# Patient Record
Sex: MALE | Race: WHITE | Employment: UNEMPLOYED | URBAN - METROPOLITAN AREA
[De-identification: names, ages, dates, MRNs, and addresses within clinical notes are randomized per-mention and may not be internally consistent; named-entity substitution may affect disease eponyms.]

---

## 2018-01-09 ENCOUNTER — OFFICE VISIT (OUTPATIENT)
Dept: ORTHOPEDIC SURGERY | Age: 38
End: 2018-01-09

## 2018-01-09 VITALS
HEIGHT: 69 IN | DIASTOLIC BLOOD PRESSURE: 79 MMHG | BODY MASS INDEX: 22.96 KG/M2 | SYSTOLIC BLOOD PRESSURE: 128 MMHG | HEART RATE: 77 BPM | WEIGHT: 155 LBS

## 2018-01-09 DIAGNOSIS — M24.151 ARTICULAR CARTILAGE DISORDER OF HIP, RIGHT: ICD-10-CM

## 2018-01-09 DIAGNOSIS — M25.851 FEMOROACETABULAR IMPINGEMENT OF RIGHT HIP: ICD-10-CM

## 2018-01-09 DIAGNOSIS — M25.551 RIGHT HIP PAIN: Primary | ICD-10-CM

## 2018-01-09 PROCEDURE — 99214 OFFICE O/P EST MOD 30 MIN: CPT | Performed by: ORTHOPAEDIC SURGERY

## 2018-01-09 PROCEDURE — 73502 X-RAY EXAM HIP UNI 2-3 VIEWS: CPT | Performed by: ORTHOPAEDIC SURGERY

## 2018-01-09 NOTE — PROGRESS NOTES
Oriented to [x] person, [x] place, and [x] time  [x] Mood appropriate for circumstances    Gait:   Gait is [] Normal  [x] Impaired Coxalgia  Assistive Device: [x] None  [] Knee Brace  [] Cane  [] Crutches   [] Aleatha Standard   [] Wheelchair  [] Other     [address] [place]   Inspection:  [x] Skin intact without abrasion, lacerations, surgical scars, pigment changes, dimpling, hairy patches or rashes  [x] Normal back alignment  [] Scoliosis [] Kyphosis  [] Dimpling  [] Hyper/hypopigmentation  [] Hairy Patches  [] Scar / Surgical incision    Palpation:   Nontender    Provocative Tests:  Negative Straight leg raise test    RIGHT HIP ORTHOPAEDIC  EXAM:   Inspection:  [x] Skin intact without abrasion, lacerations or rashes  [x] Leg lengths equal  [] Ecchymosis:  [x] none  [] mild  [] moderate  [] severe   [] Atrophy:  [x] none  [] mild  [] moderate  [] severe      Range of Motion:  [x] No flexion contracture         [] Deferred: acute injury/post-surgery/pain  [] Flexion contracture    Forward flexion: 80 positive subspine  Supine Internal rotation: 10 positive labral stress test  Supine External rotation: 45  Abduction: 55  Adduction: 25      Palpation:   Moderate tenderness over rectus femoris origin    no tenderness over greater trochanter    Provocative Tests:  [] Negative  Positive Tests:  [] Log Roll   [] Gold Test: ITB Tightness   [x] FADIR Anterior impingement: Positive   [] Posterior Impingement    [] Shuck test for insufficient suction seal   [] Dial test for capsular insufficiency:    [] Resisted adduction for athletic pubalgia   [] Resisted curl up for athletic pubalgia     Motor Function:  [x] No gross motor weakness of hip [x] No gross motor weakness of knee  [x] No gross motor weakness of ankle    [x] No gross motor weakness of great toe    [] Motor strength:   [x] Hip Flex [x] 5-/5 [] 4/5 [] 3/5 [] 2/5 [] 1/5 [] 0/5   [x] Hip ABductors [x] 5/5 [] 4/5 [] 3/5 [] 2/5 [] 1/5 [] 0/5   [x] Hip ADductors [x] 5/5 [] 4/5 [] 3/5 [] 2/5 [] 1/5 [] 0/5     Neurologic:   [x] Sensation to light touch intact  [x] Coordination / proprioception intact  Motor function intact L2-S1    Circulation:  [x] The limb is warm and well perfused. [x] Capillary refill is intact.   [] Edema:  [x] none  [] mild  [] moderate  [] severe     Assessment of Joint Laxity:    Beighton hypermobility score (0-9): 0  [] Small fingers passively able to extend beyond 90 degrees (2 pts)   [] Thumbs passively able to flex to flexor aspect of forearm (2 pts)   [] Elbows hyperextend beyond 10 degrees (2 pts)   [] Knees hyperextend beyond 10 degrees (2 pts)   [] Can rest palms on floor with forward flexion of trunk with knees extended (1 pt)          LEFT HIP ORTHOPAEDIC  EXAM:  Inspection:  [x] Skin intact without abrasion, lacerations or rashes  [x] Leg lengths equal  [] Ecchymosis:  [x] none  [] mild  [] moderate  [] severe   [] Atrophy:  [x] none  [] mild  [] moderate  [] severe      Range of Motion:  [x] No flexion contracture         [] Deferred: acute injury/post-surgery/pain  [] Flexion contracture     Forward flexion: 110  Supine Internal rotation: 20  Supine External rotation: 65  Abduction: 50  Adduction: 30      Palpation:   Nontender    Provocative Tests:  [x] Negative  Positive Tests:  [] Log Roll   [] Gold Test: ITB Tightness   [] FADIR Anterior impingement:    [] Posterior Impingement    [] Shuck test for insufficient suction seal   [] Dial test for capsular insufficiency:    [] Resisted adduction for athletic pubalgia   [] Resisted curl up for athletic pubalgia     Motor Function:  [x] No gross motor weakness of hip [x] No gross motor weakness of knee  [x] No gross motor weakness of ankle    [x] No gross motor weakness of great toe    [] Motor strength:   [x] Hip Flex [x] 5/5 [] 4/5 [] 3/5 [] 2/5 [] 1/5 [] 0/5   [x] Hip ABductors [x] 5/5 [] 4/5 [] 3/5 [] 2/5 [] 1/5 [] 0/5   [x] Hip ADductors [x] 5/5 [] 4/5 [] 3/5 [] 2/5 [] 1/5 [] 0/5

## 2018-01-09 NOTE — LETTER
Imaging- Follow Up Sheet   Ordering Physician: Dr. Ariane Jacques -  Tax Id: [id number]    Date:18    Patient:Avelino Dominguez                [id number]                      1980                  30y.o. year old,          Test ordered:                                                    CPT code:       Associated Diagnosis:      Insurance:                                                                                           Rep:                                     ? ID:                                             ? Group:                                                                  Peer to Peer:  ? Approval:                                                                             Case#  ? Exp.date:  Scan Priority ([place] [facility]):     ? Routine  ? stat  Scan:  ? Appointment    ? Time:  ? Location  :     Follow up   ? Appointment  ? Time  ? Location  [facility]:                Central scheduling :  [facility]   Fax: [phone]                       ? [facility]                [phone]               [address] [facility]                [phone]  ? Valshinrhett Person                    [phone]  ? Kelly Martinez           [phone]            ? Farida Dolan             [phone]  ? Calixto Barba               [phone]  ?  James Salazar              [phone]    [facility] [phone]             Fax: [phone]    Special Note:

## 2018-01-19 ENCOUNTER — TELEPHONE (OUTPATIENT)
Dept: ORTHOPEDIC SURGERY | Age: 38
End: 2018-01-19

## 2018-01-19 ENCOUNTER — OFFICE VISIT (OUTPATIENT)
Dept: ORTHOPEDIC SURGERY | Age: 38
End: 2018-01-19

## 2018-01-19 VITALS
DIASTOLIC BLOOD PRESSURE: 76 MMHG | HEIGHT: 69 IN | SYSTOLIC BLOOD PRESSURE: 131 MMHG | WEIGHT: 155 LBS | BODY MASS INDEX: 22.96 KG/M2 | HEART RATE: 78 BPM

## 2018-01-19 DIAGNOSIS — M24.151 ARTICULAR CARTILAGE DISORDER OF HIP, RIGHT: ICD-10-CM

## 2018-01-19 DIAGNOSIS — M25.851 FEMOROACETABULAR IMPINGEMENT OF RIGHT HIP: Primary | ICD-10-CM

## 2018-01-19 PROCEDURE — 99214 OFFICE O/P EST MOD 30 MIN: CPT | Performed by: ORTHOPAEDIC SURGERY

## 2018-01-19 NOTE — ADDENDUM NOTE
Encounter addended by: Mariela Rodriguez on: 1/19/2018 12:48 PM<BR>    Actions taken: Letter status changed

## 2018-01-19 NOTE — LETTER
? Infection  ? Poor Healing  ? Possible Damage to Nerve, Vessel, Tendon/Muscle or Bone  ? Need for further Treatment/Surgery  ? Stiffness  ? Pain  ? Residual or Recurrent Symptom  ? Anesthetic and/or Medical Risks including death  ? Risks specific to Hip arthroscopy: traction related neuropraxia of leg, foot or groin; heterotopic ossification; inability to reverse course of arthritis; chondrolysis; flexor tendinitis; microinstability       4. I have also been informed by the informing physician that there are other risks from both known and unknown causes that are attendant to the performance of any surgical procedure. I am aware that the practice of medicine and surgery is not an exact science, and that no guarantees have been made to me concerning the results of the operation and/or procedure(s). 5. I   CONSENT / REFUSE CONSENT  (strike the phrase that does not apply) to the taking of photographs before, during and/or after the operation or procedure for scientific/educational purposes. 6. I consent to the administration of anesthesia and to the use of such anesthetics as may be deemed advisable by the anesthesiologist who has been engaged by me or my physician.     7. I certify that I have read and understand the above consent to operation and/or other procedure(s); that the explanations therein referred to were made to me by the informing physician in advance of my signing this consent; that all blanks or statements requiring insertion or completion were filled in and inapplicable paragraphs, if any, were stricken before I signed; and that all questions asked by me about the operation and/or procedure(s) which I have consented to have been fully answered in a satisfactory manner.             _______________________  Witness        Signature Of Patient      Lara Lancaster MD.       Informing Physician         Signature of Informing Physician If patient is unable to sign or is a minor, complete one of the following:    (A)  Patient is a minor   years of age. (B)  Patient is unable to sign because: The undersigned represents that he or she is duly authorized to execute this consent for and on behalf of the above named patient.                Witness               o  Parent  o  Guardian   o  Spouse       o  Other (specify)

## 2018-01-19 NOTE — PROGRESS NOTES
y.o. 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 This is a condition in which there is an incongruency between the osseous structures surrounding the hip and can lead to persistent pain, labral injury and cartilage injury in some cases. Pain is anterior and has been present for 7 years. Thus far the following treatments have been tried: 7 years of activity modification, oral NSAIDs, therapy. Diagnosis:   1. Femoroacetabular impingement of right hip  CANCELED: Aluminum Crutches    CANCELED: Breg T-Scope Hip   2. Articular cartilage disorder of hip, right           Plan:     I discussed the diagnosis and the treatment options with Jai Whittaker today. There is fails conservative management, we did discuss the risks, benefits, and alternatives of surgical intervention for a hip arthroscopy. I did indicate to him that he does have some extensive hip degeneration already and as such her goal would primarily be to help reduce the accelerated trajectory his hip is currently on towards arthritis. Our goal would be to try to alleviate the main risk factors for accelerated arthritic pathology. He does understand that there is the possibility does hip may eventually go on to a hip arthroplasty. Greater than 25 minutes was spent counseling and corning care and patient signed informed consent       Return to Clinic/Follow - Up:  Michele Palma was instructed to call the office if his symptoms worsen or if new symptoms appear prior to the next scheduled visit. He is specifically instructed to contact the office between now & his scheduled appointment if he has concerns related to his condition or if he needs assistance in scheduling the above tests. He is welcome to call for an appointment sooner if he has any additional concerns or questions.           Patient Education

## 2018-01-19 NOTE — LETTER
Dr. Christiano Chandler MD  [address] [facility], [address] [place]  [phone]      Re: Clau Zamarripa  : 1980    Date of Visit:   18      To Whom It May Concern;    Clau Zamarripa is a patient under my care for a worsening orthopaedic condition. This patient is undergoing surgery for this condition on 2018 and as a result of the average post operative recovery time for this procedure, the patient has been advised of the following:    Clau Zamarripa should remain out of work until 2018       Please feel free to contact me if you need any clarification or have further questions. Should you require any copies of medical records, please forward a HIPAA compliant release of records from the patient.             Sincerely,            Christiano Chandler MD

## 2018-01-23 ENCOUNTER — TELEPHONE (OUTPATIENT)
Dept: ORTHOPEDIC SURGERY | Age: 38
End: 2018-01-23

## 2018-01-30 ENCOUNTER — TELEPHONE (OUTPATIENT)
Dept: ORTHOPEDIC SURGERY | Age: 38
End: 2018-01-30

## 2018-01-30 DIAGNOSIS — M24.151 ARTICULAR CARTILAGE DISORDER OF HIP, RIGHT: ICD-10-CM

## 2018-01-30 DIAGNOSIS — M25.851 FEMOROACETABULAR IMPINGEMENT OF RIGHT HIP: Primary | ICD-10-CM

## 2018-01-30 PROCEDURE — E0114 CRUTCH UNDERARM PAIR NO WOOD: HCPCS | Performed by: ORTHOPAEDIC SURGERY

## 2018-01-30 PROCEDURE — L1686 HO POST-OP HIP ABDUCTION: HCPCS | Performed by: ORTHOPAEDIC SURGERY

## 2018-01-30 RX ORDER — IBUPROFEN 400 MG/1
400 TABLET ORAL EVERY 6 HOURS PRN
COMMUNITY
End: 2018-01-31 | Stop reason: HOSPADM

## 2018-01-30 RX ORDER — ACETAMINOPHEN 500 MG
500 TABLET ORAL EVERY 6 HOURS PRN
COMMUNITY
End: 2018-01-31 | Stop reason: HOSPADM

## 2018-01-30 NOTE — TELEPHONE ENCOUNTER
1/30/18 - DME  - [facility]  [facility] A WEEK AND A HALF AGO AND PATIENT STILL HAD NOT RECEIVED THE BRACE. DR. Hever Malloy AND WAS TOLD WE COULD PROVIDE BRACE. I WAS ASKED TO VERIFY INFORMATION WITH THE [facility] OFFICE. I CALLED ROSARIO IRVIN, PATIENT CARE COORDINATOR ([phone]) AND WAS TOLD THAT PATIENT SHOULD OF RECEIVED BRACE FROM [facility] BUT SINCE SURGERY WAS TOMORROW AND HE DID NOT HAVE THE BRACE  THAT  WE  COULD SUPPLY THE BRACE AND THAT A COPY OF THE AUTHORIZATION NUMBER (  AUTH NUMBER FOR SURGERY) SHOULD BE SENT WITH CLAIM FOR DME ITEM. WAS TOLD SHOULD BILL THE DME WITH THAT SAME AUTHORIZATION NUMBER. ROSARIO DID ASK WHAT PERSON FROM THE CLINIC CALLED AND TO WHAT PERSON AT THE [facility] DID THEY TALK TO . I DID NOT HAVE THAT INFORMATION.

## 2018-01-30 NOTE — PRE-PROCEDURE INSTRUCTIONS
[address]                          Date of Procedure 1/31 Time of Procedure 1300    PRIOR TO PROCEDURE DATE:  1. Please follow any guidelines/instructions prior to your procedure as advised by your surgeon. 2. Arrange for someone to drive you home and be with you for the first 24 hours after discharge for your safety after your procedure for which you received sedation. Ensure it is someone we can share information with regarding your discharge. 3. You must contact your surgeon for instructions IF:   You are taking any blood thinners, aspirin, anti-inflammatory or vitamin E.   There is a change in your physical condition such as a cold, fever, rash, cuts, sores or any other infection, especially near your surgical site. 4. Do not drink alcohol the day before or day of your procedure. 5. A Pre-op History and Physical for surgery MUST be completed by your Physician or Urgent Care within 30 days of your procedure date. Please bring a copy with you on the day of your procedure and along with any other testing performed. THE DAY OF YOUR PROCEDURE:  1. Follow instructions for ARRIVAL TIME as DIRECTED BY YOUR SURGEON. If your surgeon does not give you a specific arrival time, please arrive at  [address] [place] [address] . 2. Enter the MAIN entrance from [facility] and follow the signs to the free [employer] or [employer] [place] parking (offered free of charge 6am-5pm). 3. Enter the Main Entrance of the hospital (do not enter from the lower level of the parking garage). Upon entrance, check in with the  at the main desk on your left. If no one is available at the desk, proceed into the [facility] Waiting Room and go through the door directly into the [facility]. There is a Check-in desk ACROSS from Room 5 (marked with a sign hanging from the ceiling). The phone number for the surgery center is [phone].     4. Please

## 2018-01-31 ENCOUNTER — HOSPITAL ENCOUNTER (OUTPATIENT)
Dept: PREADMISSION TESTING | Age: 38
Discharge: HOME | End: 2018-01-31
Attending: ORTHOPAEDIC SURGERY | Admitting: ORTHOPAEDIC SURGERY

## 2018-01-31 VITALS
WEIGHT: 162 LBS | TEMPERATURE: 97.4 F | OXYGEN SATURATION: 95 % | DIASTOLIC BLOOD PRESSURE: 68 MMHG | HEIGHT: 69 IN | BODY MASS INDEX: 23.99 KG/M2 | HEART RATE: 78 BPM | RESPIRATION RATE: 18 BRPM | SYSTOLIC BLOOD PRESSURE: 116 MMHG

## 2018-01-31 DIAGNOSIS — M25.851 FEMOROACETABULAR IMPINGEMENT OF RIGHT HIP: ICD-10-CM

## 2018-01-31 DIAGNOSIS — Z98.890 STATUS POST HIP SURGERY: Primary | ICD-10-CM

## 2018-01-31 LAB
ABO/RH: NORMAL
ANION GAP SERPL CALCULATED.3IONS-SCNC: 10 MMOL/L (ref 3–16)
ANTIBODY SCREEN: NORMAL
APTT: 28.9 SEC (ref 24.1–34.9)
BUN BLDV-MCNC: 14 MG/DL (ref 7–20)
CALCIUM SERPL-MCNC: 9.1 MG/DL (ref 8.3–10.6)
CHLORIDE BLD-SCNC: 106 MMOL/L (ref 99–110)
CO2: 26 MMOL/L (ref 21–32)
CREAT SERPL-MCNC: 1 MG/DL (ref 0.9–1.3)
GFR AFRICAN AMERICAN: >60
GFR NON-AFRICAN AMERICAN: >60
GLUCOSE BLD-MCNC: 110 MG/DL (ref 70–99)
HCT VFR BLD CALC: 45.1 % (ref 40.5–52.5)
HEMOGLOBIN: 15.8 G/DL (ref 13.5–17.5)
INR BLD: 1.01 (ref 0.85–1.15)
MCH RBC QN AUTO: 30.9 PG (ref 26–34)
MCHC RBC AUTO-ENTMCNC: 35 G/DL (ref 31–36)
MCV RBC AUTO: 88.4 FL (ref 80–100)
PDW BLD-RTO: 12.5 % (ref 12.4–15.4)
PLATELET # BLD: 227 K/UL (ref 135–450)
PMV BLD AUTO: 8.2 FL (ref 5–10.5)
POTASSIUM SERPL-SCNC: 4 MMOL/L (ref 3.5–5.1)
PROTHROMBIN TIME: 11.4 SEC (ref 9.6–13)
RBC # BLD: 5.1 M/UL (ref 4.2–5.9)
SODIUM BLD-SCNC: 142 MMOL/L (ref 136–145)
WBC # BLD: 7.3 K/UL (ref 4–11)

## 2018-01-31 RX ORDER — ONDANSETRON 2 MG/ML
4 INJECTION INTRAMUSCULAR; INTRAVENOUS EVERY 6 HOURS PRN
Status: DISCONTINUED | OUTPATIENT
Start: 2018-01-31 | End: 2018-02-01 | Stop reason: HOSPADM

## 2018-01-31 RX ORDER — FENTANYL CITRATE 50 UG/ML
25 INJECTION, SOLUTION INTRAMUSCULAR; INTRAVENOUS EVERY 5 MIN PRN
Status: DISCONTINUED | OUTPATIENT
Start: 2018-01-31 | End: 2018-02-01 | Stop reason: HOSPADM

## 2018-01-31 RX ORDER — PROMETHAZINE HYDROCHLORIDE 25 MG/ML
6.25 INJECTION, SOLUTION INTRAMUSCULAR; INTRAVENOUS
Status: ACTIVE | OUTPATIENT
Start: 2018-01-31 | End: 2018-01-31

## 2018-01-31 RX ORDER — GLYCOPYRROLATE 0.2 MG/ML
0.1 INJECTION INTRAMUSCULAR; INTRAVENOUS ONCE
Status: COMPLETED | OUTPATIENT
Start: 2018-01-31 | End: 2018-01-31

## 2018-01-31 RX ORDER — OXYCODONE HYDROCHLORIDE AND ACETAMINOPHEN 5; 325 MG/1; MG/1
2 TABLET ORAL EVERY 4 HOURS PRN
Status: DISCONTINUED | OUTPATIENT
Start: 2018-01-31 | End: 2018-02-01 | Stop reason: HOSPADM

## 2018-01-31 RX ORDER — NALOXONE HYDROCHLORIDE 0.4 MG/ML
0.1 INJECTION, SOLUTION INTRAMUSCULAR; INTRAVENOUS; SUBCUTANEOUS ONCE
Status: DISCONTINUED | OUTPATIENT
Start: 2018-01-31 | End: 2018-02-01 | Stop reason: HOSPADM

## 2018-01-31 RX ORDER — ASPIRIN 325 MG
325 TABLET, DELAYED RELEASE (ENTERIC COATED) ORAL 2 TIMES DAILY WITH MEALS
Qty: 56 TABLET | Refills: 0 | Status: SHIPPED | OUTPATIENT
Start: 2018-01-31 | End: 2018-03-13

## 2018-01-31 RX ORDER — ONDANSETRON 2 MG/ML
4 INJECTION INTRAMUSCULAR; INTRAVENOUS
Status: ACTIVE | OUTPATIENT
Start: 2018-01-31 | End: 2018-01-31

## 2018-01-31 RX ORDER — LABETALOL HYDROCHLORIDE 5 MG/ML
5 INJECTION, SOLUTION INTRAVENOUS EVERY 10 MIN PRN
Status: DISCONTINUED | OUTPATIENT
Start: 2018-01-31 | End: 2018-02-01 | Stop reason: HOSPADM

## 2018-01-31 RX ORDER — NALOXONE HYDROCHLORIDE 0.4 MG/ML
INJECTION, SOLUTION INTRAMUSCULAR; INTRAVENOUS; SUBCUTANEOUS
Status: DISPENSED
Start: 2018-01-31 | End: 2018-02-01

## 2018-01-31 RX ORDER — MEPERIDINE HYDROCHLORIDE 25 MG/ML
12.5 INJECTION INTRAMUSCULAR; INTRAVENOUS; SUBCUTANEOUS ONCE
Status: COMPLETED | OUTPATIENT
Start: 2018-01-31 | End: 2018-01-31

## 2018-01-31 RX ORDER — SODIUM CHLORIDE, SODIUM LACTATE, POTASSIUM CHLORIDE, CALCIUM CHLORIDE 600; 310; 30; 20 MG/100ML; MG/100ML; MG/100ML; MG/100ML
INJECTION, SOLUTION INTRAVENOUS CONTINUOUS
Status: DISCONTINUED | OUTPATIENT
Start: 2018-01-31 | End: 2018-02-01 | Stop reason: HOSPADM

## 2018-01-31 RX ORDER — DOCUSATE SODIUM 100 MG/1
100 CAPSULE, LIQUID FILLED ORAL 2 TIMES DAILY PRN
Qty: 60 CAPSULE | Refills: 0 | Status: SHIPPED | OUTPATIENT
Start: 2018-01-31 | End: 2018-03-13

## 2018-01-31 RX ORDER — CYCLOBENZAPRINE HCL 5 MG
5 TABLET ORAL 2 TIMES DAILY PRN
Qty: 45 TABLET | Refills: 0 | Status: SHIPPED | OUTPATIENT
Start: 2018-01-31 | End: 2018-02-10

## 2018-01-31 RX ORDER — FENTANYL CITRATE 50 UG/ML
50 INJECTION, SOLUTION INTRAMUSCULAR; INTRAVENOUS EVERY 5 MIN PRN
Status: DISCONTINUED | OUTPATIENT
Start: 2018-01-31 | End: 2018-02-01 | Stop reason: HOSPADM

## 2018-01-31 RX ORDER — DOCUSATE SODIUM 100 MG/1
100 CAPSULE, LIQUID FILLED ORAL 2 TIMES DAILY
Status: DISCONTINUED | OUTPATIENT
Start: 2018-01-31 | End: 2018-02-01 | Stop reason: HOSPADM

## 2018-01-31 RX ORDER — ROPIVACAINE HYDROCHLORIDE 5 MG/ML
INJECTION, SOLUTION EPIDURAL; INFILTRATION; PERINEURAL
Status: DISCONTINUED
Start: 2018-01-31 | End: 2018-02-01 | Stop reason: HOSPADM

## 2018-01-31 RX ORDER — OXYCODONE HCL 10 MG/1
20 TABLET, FILM COATED, EXTENDED RELEASE ORAL ONCE
Status: COMPLETED | OUTPATIENT
Start: 2018-01-31 | End: 2018-01-31

## 2018-01-31 RX ORDER — ACETAMINOPHEN 500 MG
1000 TABLET ORAL ONCE
Status: COMPLETED | OUTPATIENT
Start: 2018-01-31 | End: 2018-01-31

## 2018-01-31 RX ORDER — OXYCODONE HYDROCHLORIDE AND ACETAMINOPHEN 5; 325 MG/1; MG/1
1 TABLET ORAL
Status: COMPLETED | OUTPATIENT
Start: 2018-01-31 | End: 2018-01-31

## 2018-01-31 RX ORDER — PANTOPRAZOLE SODIUM 20 MG/1
20 TABLET, DELAYED RELEASE ORAL DAILY
Qty: 28 TABLET | Refills: 0 | Status: SHIPPED | OUTPATIENT
Start: 2018-01-31 | End: 2018-03-13

## 2018-01-31 RX ORDER — SODIUM CHLORIDE 0.9 % (FLUSH) 0.9 %
10 SYRINGE (ML) INJECTION EVERY 12 HOURS SCHEDULED
Status: DISCONTINUED | OUTPATIENT
Start: 2018-01-31 | End: 2018-02-01 | Stop reason: HOSPADM

## 2018-01-31 RX ORDER — CYCLOBENZAPRINE HCL 10 MG
5 TABLET ORAL 3 TIMES DAILY PRN
Status: DISCONTINUED | OUTPATIENT
Start: 2018-01-31 | End: 2018-02-01 | Stop reason: HOSPADM

## 2018-01-31 RX ORDER — SCOLOPAMINE TRANSDERMAL SYSTEM 1 MG/1
1 PATCH, EXTENDED RELEASE TRANSDERMAL ONCE
Status: DISCONTINUED | OUTPATIENT
Start: 2018-01-31 | End: 2018-02-01 | Stop reason: HOSPADM

## 2018-01-31 RX ORDER — HYDRALAZINE HYDROCHLORIDE 20 MG/ML
5 INJECTION INTRAMUSCULAR; INTRAVENOUS EVERY 10 MIN PRN
Status: DISCONTINUED | OUTPATIENT
Start: 2018-01-31 | End: 2018-02-01 | Stop reason: HOSPADM

## 2018-01-31 RX ORDER — INDOMETHACIN 75 MG/1
75 CAPSULE, EXTENDED RELEASE ORAL
Qty: 7 CAPSULE | Refills: 0 | Status: SHIPPED | OUTPATIENT
Start: 2018-01-31 | End: 2018-03-13

## 2018-01-31 RX ORDER — OXYCODONE HYDROCHLORIDE AND ACETAMINOPHEN 5; 325 MG/1; MG/1
1 TABLET ORAL EVERY 4 HOURS PRN
Status: DISCONTINUED | OUTPATIENT
Start: 2018-01-31 | End: 2018-02-01 | Stop reason: HOSPADM

## 2018-01-31 RX ORDER — NALOXONE HYDROCHLORIDE 0.4 MG/ML
0.1 INJECTION, SOLUTION INTRAMUSCULAR; INTRAVENOUS; SUBCUTANEOUS PRN
Status: COMPLETED | OUTPATIENT
Start: 2018-01-31 | End: 2018-01-31

## 2018-01-31 RX ORDER — SODIUM CHLORIDE 0.9 % (FLUSH) 0.9 %
10 SYRINGE (ML) INJECTION PRN
Status: DISCONTINUED | OUTPATIENT
Start: 2018-01-31 | End: 2018-02-01 | Stop reason: HOSPADM

## 2018-01-31 RX ORDER — OXYCODONE HYDROCHLORIDE AND ACETAMINOPHEN 5; 325 MG/1; MG/1
1 TABLET ORAL EVERY 4 HOURS PRN
Qty: 30 TABLET | Refills: 0 | Status: SHIPPED | OUTPATIENT
Start: 2018-01-31 | End: 2018-02-07

## 2018-01-31 RX ORDER — ACETAMINOPHEN 325 MG/1
650 TABLET ORAL EVERY 4 HOURS PRN
Status: DISCONTINUED | OUTPATIENT
Start: 2018-01-31 | End: 2018-02-01 | Stop reason: HOSPADM

## 2018-01-31 RX ADMIN — FENTANYL CITRATE 50 MCG: 50 INJECTION, SOLUTION INTRAMUSCULAR; INTRAVENOUS at 11:40

## 2018-01-31 RX ADMIN — OXYCODONE HYDROCHLORIDE AND ACETAMINOPHEN 1 TABLET: 5; 325 TABLET ORAL at 13:08

## 2018-01-31 RX ADMIN — GLYCOPYRROLATE 0.1 MG: 0.2 INJECTION INTRAMUSCULAR; INTRAVENOUS at 06:56

## 2018-01-31 RX ADMIN — MEPERIDINE HYDROCHLORIDE 12.5 MG: 25 INJECTION INTRAMUSCULAR; INTRAVENOUS; SUBCUTANEOUS at 11:31

## 2018-01-31 RX ADMIN — Medication 1000 MG: at 06:29

## 2018-01-31 RX ADMIN — Medication 0.5 MG: at 12:09

## 2018-01-31 RX ADMIN — FENTANYL CITRATE 50 MCG: 50 INJECTION, SOLUTION INTRAMUSCULAR; INTRAVENOUS at 11:31

## 2018-01-31 RX ADMIN — OXYCODONE HCL 20 MG: 10 TABLET, FILM COATED, EXTENDED RELEASE ORAL at 06:26

## 2018-01-31 RX ADMIN — SODIUM CHLORIDE, SODIUM LACTATE, POTASSIUM CHLORIDE, CALCIUM CHLORIDE: 600; 310; 30; 20 INJECTION, SOLUTION INTRAVENOUS at 06:55

## 2018-01-31 RX ADMIN — Medication 0.5 MG: at 12:02

## 2018-01-31 RX ADMIN — NALOXONE HYDROCHLORIDE 0.1 MG: 0.4 INJECTION, SOLUTION INTRAMUSCULAR; INTRAVENOUS; SUBCUTANEOUS at 13:13

## 2018-01-31 ASSESSMENT — PAIN SCALES - GENERAL
PAINLEVEL_OUTOF10: 8
PAINLEVEL_OUTOF10: 8
PAINLEVEL_OUTOF10: 7
PAINLEVEL_OUTOF10: 9
PAINLEVEL_OUTOF10: 7
PAINLEVEL_OUTOF10: 9
PAINLEVEL_OUTOF10: 6
PAINLEVEL_OUTOF10: 5
PAINLEVEL_OUTOF10: 6
PAINLEVEL_OUTOF10: 9
PAINLEVEL_OUTOF10: 8
PAINLEVEL_OUTOF10: 5
PAINLEVEL_OUTOF10: 8

## 2018-01-31 ASSESSMENT — PAIN DESCRIPTION - PAIN TYPE
TYPE: SURGICAL PAIN

## 2018-01-31 ASSESSMENT — PAIN DESCRIPTION - LOCATION
LOCATION: HIP

## 2018-01-31 ASSESSMENT — PAIN DESCRIPTION - DESCRIPTORS
DESCRIPTORS: ACHING
DESCRIPTORS: ACHING
DESCRIPTORS: ACHING;CONSTANT;STABBING
DESCRIPTORS: ACHING
DESCRIPTORS: SHOOTING

## 2018-01-31 ASSESSMENT — PAIN - FUNCTIONAL ASSESSMENT: PAIN_FUNCTIONAL_ASSESSMENT: 0-10

## 2018-01-31 ASSESSMENT — PAIN DESCRIPTION - ORIENTATION
ORIENTATION: RIGHT

## 2018-01-31 ASSESSMENT — PAIN DESCRIPTION - PROGRESSION
CLINICAL_PROGRESSION: GRADUALLY WORSENING
CLINICAL_PROGRESSION: GRADUALLY IMPROVING
CLINICAL_PROGRESSION: RAPIDLY IMPROVING

## 2018-01-31 NOTE — PROGRESS NOTES
Patient admitted to PACU # 13 from OR at 1111 post RIGHT HIP ARTHROSCOPY LABRAL DEBRIDEMENT , ACETABULAR RIM  TRIMMING AND FEMORAL OSTEOPLASTY, CHONDROPLASTY, LOOSE BODY REMOVAL , SUB-SPINE  DECOMPRESSION, per Dr. Latoya Espinosa. Attached to PACU monitoring system and report received from anesthesia provider. Patient was reported to be hemodynamically stable during procedure. Patient drowsy on admission and having pain. See MAR for med administration. Still a little shivering, warm blankets applied.
Pt CO of itching chest and stomach unknown source called Dr Debra Segovia repeated pt CO received order for Narcan 0.1mg
The following educational items and goals will be achieved upon completion of the patient's Pre-admission testing experience:             Identify the learner who is being assessed for education:  patient                    Ability to Learn:  Exhibits ability to grasp concepts and respond to questions: High  Ready to Learn: Yes  calm   Preferred Method of Learning:  verbal  Barriers to Learning: Verbalizes interest  Special Considerations due to cultural, Orthodoxy, spiritual beliefs:  No  Language:  English  :  Charo Aldrich  [x] Appropriate evaluation / integration of data as delineated by ASPAN Standards of Perianesthesia Nursing Practice    Pain scale and pain management   [x]Patient verbalizes understanding of pain scale and pain management  [x]Pre-operative determination of patients anticipated Post-Operative pain goal:   4 of 10 on 10 point scale post op goal  [] Other     Medication(s) - Compliance with preop medication instructions  [x] Patient verbalizes understanding of preop medications (see [facility] Presurgical Instructions)    Instructions, Pre op                                                                                            [x] Patient verbalizes understanding of presurgical instructions as reviewed with phone interview nurse or in-person nurse review    Fall Risk Potential, Preoperatively                                                                                   [x]No preoperative risk identified  []Preop risk identified:                    []Sensory deficit        []Motor deficit        []Balance problem        []Home medication        []Uses assistive device                    []History of a Fall within the last 30 days    Goal(s) for fall prevention:  [x]Prevent fall or injury by requesting assistance with activities of daily living  [x]Patient / Significant other verbalizes understanding the need to call for
include, but are not limited to, sore throat, hoarseness, injury to face, mouth, or teeth; nausea; headache; injury to blood vessels or nerves; death, brain damage, or paralysis. I understand that if I have a Limitation of Treatment order in effect during my hospitalization, the order may or may not be in effect during this procedure. I give my doctor permission to give me blood or blood products. I understand that there are risks with receiving blood such as hepatitis, AIDS, fever, or allergic reaction. I acknowledge that the risks, benefits, and alternatives of this treatment have been explained to me and that no express or implied warranty has been given by the hospital, any blood bank, or any person or entity as to the blood or blood components transfused. At the discretion of my doctor, I agree to allow observers, equipment/product representatives and allow photographing, and/or televising of the procedure, provided my name or identity is maintained confidentially. I agree the hospital may dispose of or use for scientific or educational purposes any tissue, fluid, or body parts which may be removed.     ________________________________Date________Time______ am/pm  ([place] [facility])  Patient or Signature of Closest Relative or Legal Guardian    ________________________________Date________Time______am/pm      Page 1 of  2  Witness

## 2018-01-31 NOTE — ANESTHESIA PRE-OP
ANESTHESIA PRE-OP NOTE    NAME: Ana Maria Jovel  : 1980  AGE: 30 y.o.  MED. REC. #: [id number]    DOS: 18      PROCEDURE:  RIGHT HIP ARTHROSCOPY, POSSIBLE LABRAL REPAIR VERSUS DEBRIDEMENT, ACETABULAR RIM TRIMMING AND FEMORAL OSTEOPLASTY, CHONDROPLASTY, MICROFRACTURE OF FULL THICKNESS CARTILAGE DEFECT, LOOSE BODY REMOVAL, SUB-SPINE DECOMPRESSION, CAPSULAR REPAIR VERSUS PLICATION, TROCHANTERIC BURSECTOMY WITH FOCAL IT BAND RELEASE,  LABRAL RECONSTRUCTION      SURGEON: Lisy Huber    HPI: 31 yo M with R knee pain    ALLERGIES: Sulfa antibiotics Height: 5' 9\" (175.3 cm) Weight: 162 lb (73.5 kg)There were no vitals filed for this visit. MEDICATIONS:  Patient's Medications   New Prescriptions    No medications on file   Previous Medications    ACETAMINOPHEN (TYLENOL) 500 MG TABLET    Take 500 mg by mouth every 6 hours as needed for Pain    DICLOFENAC (VOLTAREN) 75 MG EC TABLET    Take 75 mg by mouth 2 times daily    IBUPROFEN (ADVIL;MOTRIN) 400 MG TABLET    Take 400 mg by mouth every 6 hours as needed for Pain    TIZANIDINE (ZANAFLEX) 2 MG TABLET    Take 2 mg by mouth every 6 hours as needed   Modified Medications    No medications on file   Discontinued Medications    No medications on file      ASA STATUS: 2  NPO since: > 8 hrs   Patient identified/chart reviewed: yes  CV:   no HTN    no HLD    no CAD   PULMONARY: no  Asthma    no COPD   no SHI +current 1 PPD smoker   ENDOCRINE: no DM     no Thyroid Disease   GI: no GERD   : no known renal disease   NEURO/PSYCH: no CVA   no Seizure Disorder   MUSCULOSKELETAL: R hip pain   HEME/ONC: no DVT  no PE   no Anemia      OTHER:   EKG:   Echocardiogram:   COMMENTS:        PSH:  has a past surgical history that includes Shoulder arthroscopy (Right). There is no problem list on file for this patient. PMH:  No past medical history on file.    PERSONAL/FAMILY ANESTHESIA PROBLEMS: no     AIRWAY ASSESSMENT:  MALLAMPATI: I DENTITION: slightly chipped front incisors, no loose teeth per patient ROM: full     ANESTHETIC PLAN: GA with standard ASA monitors    If dayne-operative block planned, describe:    CONSENT: Risks/benefits/options/questions discussed.  Patient agrees:  yes

## 2018-01-31 NOTE — ANESTHESIA POST-OP
Anesthesia Post-op Note    Patient: Jonnie Cronin    Procedure(s) Performed: right hip arthroscopy, acetabular osteoplasty, femoral neck osteoplasty, labral debridement, removal of loose bodies     DOS: 1.31.18    Surgeon: Prem Garza     Anesthesia type: general    Post-op assessment:  Anesthetic Problems: no   Last Vitals:    Vitals:    01/31/18 1304   BP: 123/70   Pulse: 73   Resp: 16   Temp:    SpO2: 100%     Cardiovascular System Stable: yes  Respiratory Function: Airway Patent yes  ETT no  Ventilator no  Level of consciousness: awake, alert and oriented  Post-op pain: adequate analgesia  Hydration Adequate: yes  Nausea/Vomiting: no  Other Issues:

## 2018-01-31 NOTE — H&P
Keren Gates    [id number]    [facility] Same Day Surgery Update H & P  Department of General Surgery   Surgical Service   CNP Pre-operative History and Physical  Last H & P within the last 30 days. DIAGNOSIS:   FEMOROACETABULAR IMPINGEMENT OF R    PROCEDURE:  Right Hip Arthroscopy Possible: Labral Repair VS Debridement, Acetabular Rim Trimming , Femoral Osteoplasty, Chondroplasty, Microfracture Of Full Thickness Cartilage Defect, Loose Body Removal, Sub-Spine Decompression, Capsular Repair VS. Plication;    Trochanteric Bursectomy With  Focal IT Band Release, Labral Reconstruction          HISTORY OF PRESENT ILLNESS: Pt. Is a 30 y.o. Male who c/o of right hip pain, stiffness, limited ROM and inability to achieve and maintain FWB status with ambulation. Sx. Not relieved with conservative treatment. Pt. Is now here for surgical intervention. Please see initial H & P     Past Medical History:    No past medical history on file. Past Surgical History:        Procedure Laterality Date    SHOULDER ARTHROSCOPY Right     LABRUM      Past Social History:  Social History     Social History    Marital status:      Spouse name: N/A    Number of children: N/A    Years of education: N/A     Social History Main Topics    Smoking status: Current Every Day Smoker     Packs/day: 1.00    Smokeless tobacco: Never Used    Alcohol use No    Drug use: No    Sexual activity: Not on file     Other Topics Concern    Not on file     Social History Narrative    No narrative on file         Medications Prior to Admission:      Prior to Admission medications    Medication Sig Start Date End Date Taking?  Authorizing Provider   ibuprofen (ADVIL;MOTRIN) 400 MG tablet Take 400 mg by mouth every 6 hours as needed for Pain   Yes Historical Provider, MD   acetaminophen (TYLENOL) 500 MG tablet Take 500 mg by mouth every 6 hours as needed for Pain   Yes Historical Provider, MD   diclofenac (VOLTAREN) 75 MG EC tablet Take 75 mg by mouth 2 times daily   Yes Historical Provider, MD   tiZANidine (ZANAFLEX) 2 MG tablet Take 2 mg by mouth every 6 hours as needed   Yes Historical Provider, MD         Allergies:  Sulfa antibiotics    PHYSICAL EXAM:      /76   Pulse 68   Temp 98 °F (36.7 °C) (Oral)   Resp 16   Ht 5' 9\" (1.753 m)   Wt 162 lb (73.5 kg)   SpO2 98%   BMI 23.92 kg/m²      Heart:  regular rate and rhythm,no murmur     Lungs:  No increased work of breathing, good air exchange, clear to auscultation bilaterally, no crackles or wheezing    Abdomen:  soft, non-distended, non-tender, no rebound tenderness or guarding, normal active bowel sounds and no masses palpated    ASSESSMENT AND PLAN:    1. Patient seen and focused exam done today- no new changes since last physical exam on 01/24/18    2. Access to ancillary services are available per request of the provider.     Carissa Burns CNP     1/31/2018

## 2018-01-31 NOTE — OP NOTE
the 11 oclock to the 3 oclock position. The acetabuloplasty was then performed with a 5.5mm bur to completely eliminate the pincer lesion. The amount of bone resection was determined by intraoperative fluoroscopic evaluation. Care was taken to preserve capsular integrity for later repair. Because the labrum was macerated, degenerative and partially ossified, it was not deemed to be viable for repair. As such, a mechanical shaver was used to debride the unhealthy labral tissue from 11 to 3 o'clock. ARTHROSCOPIC REMOVAL OF LOOSE BODIES:  At this time, attention was turned to the rim of the hip joint. There were 3 osseous loose fragments, one of which was 25mm in length and 10mm in width that was removed from the rim under arthroscopic visualization. Care was taken to remove only the loose fragments of bone which likely were contributing to some of the sharp pain. SUBSPINE DECOMPRESSION:  At this time, attention was turned to the subspine region at the inferior aspect of the AIIS. The patient had a type II subspine. Using an arthroscopic eliazar under fluoroscopic guidance, a subspine decompression was performed to resect the inferior aspect of the AIIS. Care was taken to protect the origin of the direct head of the rectus femoris. ACETABULAR CHONDROPLASTY: With the hip in traction, the cartilage overlying the area of pincer resection was noted to have Grade IV chondromalacia with chondral delamination. This cartilage was stabilized using mechanical shaver back to a normal and stable transition zone. LIMITED SYNOVECTOMY AND DEBRIDEMENT: Using the radiofrequency device and a mechanical shaver, a synovectomy was performed to remove the inflamed lining of the hip joint and cotyloid fossa. FEMORAL HEAD-NECK OSTEOCHONDROPLASTY: The femoral osteochondroplasty was performed after dynamic examination of the hip to verify the location of the cam deformity and the area of impingement with the labrum.

## 2018-02-01 ENCOUNTER — TELEPHONE (OUTPATIENT)
Dept: ORTHOPEDIC SURGERY | Age: 38
End: 2018-02-01

## 2018-02-01 ENCOUNTER — HOSPITAL ENCOUNTER (OUTPATIENT)
Dept: OTHER | Age: 38
Discharge: HOME | End: 2018-02-28
Attending: ORTHOPAEDIC SURGERY | Admitting: ORTHOPAEDIC SURGERY

## 2018-02-01 NOTE — FLOWSHEET NOTE
Medial      Superior      Inferior            ROM      Passive Sheet pull 10 x 10 secs supine     Active      Weight Shift      Hang Weights      Sheet Pulls      Ankle Pumps X 30            CKC      Calf raises      Wall sits      Step ups      1 leg stand      Squatting      CC TKE      Balance      Monster Walks      Bridging      Triple threats      Stool Scoots      PRE      Extension   RANGE:   Flexion   RANGE:         Cable Column            Leg Press   RANGE:         Bike      Treadmill              Patient education: Pt was educated on PT diagnosis, prognosis, and plan of care. Pt was educated on the use of ice throughout the day. Pt was educated on signs/symptoms of infection and DVT and to call with any questions or concerns. Pt educated on post-op dressings, DONA hose, and use of crutches/AD. Therapeutic Exercise and NMR EXR  [x] (82343) Provided verbal/tactile cueing for activities related to strengthening, flexibility, endurance, ROM for improvements in LE, proximal hip, and core control with self care, mobility, lifting, ambulation.  [] (58430) Provided verbal/tactile cueing for activities related to improving balance, coordination, kinesthetic sense, posture, motor skill, proprioception  to assist with LE, proximal hip, and core control in self care, mobility, lifting, ambulation and eccentric single leg control.      NMR and Therapeutic Activities:    [x] (35998 or 72854) Provided verbal/tactile cueing for activities related to improving balance, coordination, kinesthetic sense, posture, motor skill, proprioception and motor activation to allow for proper function of core, proximal hip and LE with self care and ADLs  [] (78853) Gait Re-education- Provided training and instruction to the patient for proper LE, core and proximal hip recruitment and positioning and eccentric body weight control with ambulation re-education including up and down stairs     Home Exercise Program:  Pt was

## 2018-02-01 NOTE — PLAN OF CARE
into medical record. Patient has been instructed to contact their primary care physician regarding ROS issues if not already being addressed at this time. Co-morbidities/Complexities (which will affect course of rehabilitation):   []None           Arthritic conditions   []Rheumatoid arthritis (M05.9)  [x]Osteoarthritis (M19.91)   Cardiovascular conditions   []Hypertension (I10)  []Hyperlipidemia (E78.5)  []Angina pectoris (I20)  []Atherosclerosis (I70)   Musculoskeletal conditions   []Disc pathology   []Congenital spine pathologies   []Prior surgical intervention  []Osteoporosis (M81.8)  []Osteopenia (M85.8)   Endocrine conditions   []Hypothyroid (E03.9)  []Hyperthyroid Gastrointestinal conditions   []Constipation (L44.22)   Metabolic conditions   []Morbid obesity (E66.01)  []Diabetes type 1(E10.65) or 2 (E11.65)   []Neuropathy (G60.9)     Pulmonary conditions   []Asthma (J45)  []Coughing   []COPD (J44.9)   Psychological Disorders  [x]Anxiety (F41.9)  [x]Depression (F32.9)   []Other:   [x]Other: current smoker          Barriers to/and or personal factors that will affect rehab potential:              []Age  []Sex              []Motivation/Lack of Motivation                        [x]Co-Morbidities              []Cognitive Function, education/learning barriers              [x]Environmental, home barriers              [x]profession/work barriers  []past PT/medical experience  []other:  Justification: Pt does had sig OA in hip may require longer rehab to increase motion and get pain-free. Current smoke may limit/increase healing time. He also needs to return to heavy lifting for his job. Falls Risk Assessment (30 days):   [x] Falls Risk assessed and no intervention required.   [] Falls Risk assessed and Patient requires intervention due to being higher risk   TUG score (>12s at risk):     [] Falls education provided, including       G-Codes:  PT G-Codes  Functional Assessment Tool Used: LEFS  Score: 8.75%  Functional Limitation: Mobility: Walking and moving around  Mobility: Walking and Moving Around Current Status (): At least 80 percent but less than 100 percent impaired, limited or restricted  Mobility: Walking and Moving Around Goal Status (): At least 1 percent but less than 20 percent impaired, limited or restricted    ASSESSMENT:   Functional Impairments:     []Noted lumbar/proximal hip/LE joint hypomobility   [x]Decreased LE functional ROM   [x]Decreased core/proximal hip strength and neuromuscular control   [x]Decreased LE functional strength   [x]Reduced balance/proprioceptive control   []other:      Functional Activity Limitations (from functional questionnaire and intake)   [x]Reduced ability to tolerate prolonged functional positions   [x]Reduced ability or difficulty with changes of positions or transfers between positions   [x]Reduced ability to maintain good posture and demonstrate good body mechanics with sitting, bending, and lifting   [x]Reduced ability to sleep   [x] Reduced ability or tolerance with driving and/or computer work   [x]Reduced ability to perform lifting, carrying tasks   [x]Reduced ability to squat   [x]Reduced ability to forward bend   [x]Reduced ability to ambulate prolonged functional periods/distances/surfaces   [x]Reduced ability to ascend/descend stairs   [x]Reduced ability to run, hop, cut or jump   []other:    Participation Restrictions   [x]Reduced participation in self care activities   [x]Reduced participation in home management activities   [x]Reduced participation in work activities   [x]Reduced participation in social activities. [x]Reduced participation in sport/recreation activities. Classification :    [x]Signs/symptoms consistent with post-surgical status including decreased ROM, strength and function.    []Signs/symptoms consistent with joint sprain/strain   []Signs/symptoms consistent with patella-femoral syndrome   []Signs/symptoms consistent

## 2018-02-05 ENCOUNTER — TELEPHONE (OUTPATIENT)
Dept: ORTHOPEDIC SURGERY | Age: 38
End: 2018-02-05

## 2018-02-05 NOTE — TELEPHONE ENCOUNTER
Patient is having headaches, not sure if sinus or tension. Was taking flexorol and indocin stopped taking on Saturday. Please advise.

## 2018-02-06 ENCOUNTER — HOSPITAL ENCOUNTER (OUTPATIENT)
Dept: PHYSICAL THERAPY | Age: 38
Discharge: HOME | End: 2018-02-07
Admitting: ORTHOPAEDIC SURGERY

## 2018-02-06 ENCOUNTER — TELEPHONE (OUTPATIENT)
Dept: ORTHOPEDIC SURGERY | Age: 38
End: 2018-02-06

## 2018-02-06 NOTE — TELEPHONE ENCOUNTER
Lm: checking on the patient to see how he is feeling. He didn't schedule an appointment to come see us today. I also wanted to make sure the patient continue taking the indocin.

## 2018-02-06 NOTE — FLOWSHEET NOTE
[facility] [address] [place] [address]                                                         Physical Therapy Daily Treatment Note  Date:  2018    Patient Name:  Clyde Mcneill    :  1980  MRN: [id number]    Medical/Treatment Diagnosis Information:  · Diagnosis: M25.551 R hip pain; s/p R hip arthroplasty on 18  PROCEDURE PERFORMED:  1. Right Hip arthroscopic labral debridement of macerated labral tissue    2. Right Hip arthroscopic acetabuloplasty to resect pincer lesion   3. Right Hip arthroscopic femoral head-neck osteochondroplasty to resect cam lesion  4. Right Hip arthroscopic removal of loose body / excision of os acetabuli  5. Right Hip subspine decompression  6. Right Hip acetabular chondroplasty   7. Right Hip arthroscopic synovectomy    · Treatment Diagnosis: M25.551 R knee pain; M62.81 muscle weakness  Insurance/Certification information:  PT Insurance Information: [place] insurance - 12 visits through 2018; Fax notes each week  Physician Information:  Referring Practitioner: Megan Elizalde MD  Plan of care signed (Y/N):     Date of Patient follow up with Physician: 18    G-Code (if applicable):      Date G-Code Applied:  2018       Progress Note: []  Yes  [x]  No  Next due by: Visit #10       Latex Allergy:  [x]NO      []YES  Preferred Language for Healthcare:   [x]English       []other:    Visit # Insurance Allowable   2 12 auth through 2018- also sent off request/faxed notes after 1st visit     Pain level:  4-5/10 On 2018     SUBJECTIVE: Pt hasn't taken pain meds since . Pt is taking Indocin and Flexeril. The exercises are going well at home and feels like this is all he does all day along with the CPM machine. Pt reports that he has been having tension headaches and is not sure why. Pt reports he tried different positions but couldn't get comfortable.  His wife ambulation re-education including up and down stairs     Home Exercise Program:  Pt was instructed in, and safely and correctly demonstrated home exercise program. Patient verbalized understanding of proper frequency of exercises. Copy of exercises was scanned into patient chart and can be found in the media file. Updated as above on 2/6/18  [x] (85340) Reviewed/Progressed HEP activities related to strengthening, flexibility, endurance, ROM of core, proximal hip and LE for functional self-care, mobility, lifting and ambulation/stair navigation   [] (60196)Reviewed/Progressed HEP activities related to improving balance, coordination, kinesthetic sense, posture, motor skill, proprioception of core, proximal hip and LE for self care, mobility, lifting, and ambulation/stair navigation      Manual Treatments:  PROM / STM / Oscillations-Mobs:  G-I, II, III, IV (PA's, Inf., Post.)  [x] (95296) Provided manual therapy to mobilize LE, proximal hip and/or LS spine soft tissue/joints for the purpose of modulating pain, promoting relaxation,  increasing ROM, reducing/eliminating soft tissue swelling/inflammation/restriction, improving soft tissue extensibility and allowing for proper ROM for normal function with self care, mobility, lifting and ambulation. PROM of R hip into hip flex x 30,  Hip circles CW/CCW in neutral x 30, hip circles knee bent to 90 flex CW/CCW x 30,     Modalities:  Ice x 15 mins- prone    Charges:  Timed Code Treatment Minutes: 53   Total Treatment Minutes: 68     [] EVAL (LOW) 23896   [] EVAL (MOD) 17814   [] EVAL (HIGH) 93720   [] RE-EVAL   [x] YP(72789) x  2   [] IONTO  [] NMR (84771) x      [] VASO  [x] Manual (07548) x  1    [] Other:  [x] TA x  1    [] Mech Traction (82543)  [] ES(attended) (04018)      [] ES (un) (31045):     GOALS: Short Term Goals: To be achieved in: 2 weeks  1. Independent in HEP and progression per patient tolerance, in order to prevent re-injury.    2. Patient will have a decrease in pain to 0-2/10 to facilitate improvement in movement, function, and ADLs as indicated by Functional Deficits.     Long Term Goals: To be achieved in: 12 weeks  1. Functional Disability index score of 20% or less for the [facility] to assist with reaching prior level of function. 2. Patient will demonstrate increased R hip AROM to 120 flex, 10 extn, 30 IR, 40 ER to allow for proper joint functioning as indicated by patients Functional Deficits. 3. Patient will demonstrate an increase in R hip strength to 4+/5 hip abd and extn and knee flex/extn to 5/5 to allow for proper functional mobility as indicated by patients Functional Deficits. 4. Patient will return to full duty work without increased symptoms or restriction. 5. 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 [] Progression is slowed due to complexities listed. [] Progression has been slowed due to co-morbidities. [x] Plan just implemented, too soon to assess goals progression  [] Other:     ASSESSMENT:  See eval    Treatment/Activity Tolerance:  [x] Patient tolerated treatment well [] Patient limited by fatique  [] Patient limited by pain  [] Patient limited by other medical complications  [x] Other: Pt was encouraged to follow up with MD regarding medication questions and CPM use. He was tight in flex PROM, unable to get past 90 flex without stiffness and pain and did not push into this with PROM. He was tight in hip circles also but this is the first time this was done. Will continue to perform PROM of his hip each session to try to improve motion. He tolerated isometrics well today.      Prognosis: [x] Good [] Fair  [] Poor    Patient Requires Follow-up: [x] Yes  [] No    PLAN: See eval; send PT notes each week; PROM of hip  [x] Continue per plan of care [] Alter current plan (see comments)  [] Plan of care initiated [] Hold pending MD visit [] Discharge    Electronically signed by: Dorothy Ya PT, DPT

## 2018-02-07 ENCOUNTER — TELEPHONE (OUTPATIENT)
Dept: ORTHOPEDIC SURGERY | Age: 38
End: 2018-02-07

## 2018-02-08 ENCOUNTER — HOSPITAL ENCOUNTER (OUTPATIENT)
Dept: PHYSICAL THERAPY | Age: 38
Discharge: HOME | End: 2018-02-09
Admitting: ORTHOPAEDIC SURGERY

## 2018-02-08 NOTE — FLOWSHEET NOTE
[address] [facility] [address], [place]                                                         Physical Therapy Daily Treatment Note  Date:  2018    Patient Name:  Mic Rodriguez    :  1980  MRN: [id number]    Medical/Treatment Diagnosis Information:  · Diagnosis: M25.551 R hip pain; s/p R hip arthroplasty on 18  PROCEDURE PERFORMED:  1. Right Hip arthroscopic labral debridement of macerated labral tissue    2. Right Hip arthroscopic acetabuloplasty to resect pincer lesion   3. Right Hip arthroscopic femoral head-neck osteochondroplasty to resect cam lesion  4. Right Hip arthroscopic removal of loose body / excision of os acetabuli  5. Right Hip subspine decompression  6. Right Hip acetabular chondroplasty   7. Right Hip arthroscopic synovectomy    · Treatment Diagnosis: M25.551 R knee pain; M62.81 muscle weakness  Insurance/Certification information:  PT Insurance Information: [place] insurance - 12 visits through 2018; Fax notes each week  Physician Information:  Referring Practitioner: Megan Elizalde MD  Plan of care signed (Y/N):     Date of Patient follow up with Physician: 18    G-Code (if applicable):      Date G-Code Applied:  2018       Progress Note: []  Yes  [x]  No  Next due by: Visit #10       Latex Allergy:  [x]NO      []YES  Preferred Language for Healthcare:   [x]English       []other:    Visit # Insurance Allowable   3 12 auth through 2018- also sent off request/faxed notes after 1st visit     Pain level:  3/10 On 2018     SUBJECTIVE:  Pt reports he is having less pain than last time. He had some pain lateral hip after last session but more like muscle soreness than anything. He has had more headches but reports this is likely a sinus headache as his family has been having these also.    OBJECTIVE: See eval  Observation:   Test measurements:    Joint mobility: n/t [employer]      Bridging      Triple threats      Stool Scoots      PRE      Extension   RANGE:   Flexion   RANGE:         Cable Column            Leg Press   RANGE:         Bike      Treadmill              Patient education: Pt was educated on PT diagnosis, prognosis, and plan of care. Pt was educated on the use of ice throughout the day. Pt was educated on signs/symptoms of infection and DVT and to call with any questions or concerns. Pt educated on post-op dressings, DONA hose, and use of crutches/AD. Therapeutic Exercise and NMR EXR  [x] (48480) Provided verbal/tactile cueing for activities related to strengthening, flexibility, endurance, ROM for improvements in LE, proximal hip, and core control with self care, mobility, lifting, ambulation.  [] (81932) Provided verbal/tactile cueing for activities related to improving balance, coordination, kinesthetic sense, posture, motor skill, proprioception  to assist with LE, proximal hip, and core control in self care, mobility, lifting, ambulation and eccentric single leg control. NMR and Therapeutic Activities:    [x] (46729 or 74068) Provided verbal/tactile cueing for activities related to improving balance, coordination, kinesthetic sense, posture, motor skill, proprioception and motor activation to allow for proper function of core, proximal hip and LE with self care and ADLs  [] (41337) Gait Re-education- Provided training and instruction to the patient for proper LE, core and proximal hip recruitment and positioning and eccentric body weight control with ambulation re-education including up and down stairs     Home Exercise Program:  Pt was instructed in, and safely and correctly demonstrated home exercise program. Patient verbalized understanding of proper frequency of exercises. Copy of exercises was scanned into patient chart and can be found in the media file.   Updated as above on 2/6/18  [x] [phone]) Reviewed/Progressed HEP activities related to strengthening, flexibility, endurance, ROM of core, proximal hip and LE for functional self-care, mobility, lifting and ambulation/stair navigation   [] (20962)Reviewed/Progressed HEP activities related to improving balance, coordination, kinesthetic sense, posture, motor skill, proprioception of core, proximal hip and LE for self care, mobility, lifting, and ambulation/stair navigation      Manual Treatments:  PROM / STM / Oscillations-Mobs:  G-I, II, III, IV (PA's, Inf., Post.)  [x] (50261) Provided manual therapy to mobilize LE, proximal hip and/or LS spine soft tissue/joints for the purpose of modulating pain, promoting relaxation,  increasing ROM, reducing/eliminating soft tissue swelling/inflammation/restriction, improving soft tissue extensibility and allowing for proper ROM for normal function with self care, mobility, lifting and ambulation. PROM of R hip into hip flex x 30,  Hip circles CW/CCW in neutral x 30, hip circles knee bent to 90 flex CW/CCW x 30,     Modalities:  Ice x 15 mins- prone    Charges:  Timed Code Treatment Minutes: 42   Total Treatment Minutes: 60     [] EVAL (LOW) 54602   [] EVAL (MOD) 69742   [] EVAL (HIGH) 27608   [] RE-EVAL   [x] AL(57028) x  1   [] IONTO  [] NMR (81563) x      [] VASO  [x] Manual (04607) x  1    [] Other:  [x] TA x  1    [] Mech Traction (18285)  [] ES(attended) (96560)      [] ES (un) (05548):     GOALS: Short Term Goals: To be achieved in: 2 weeks  1. Independent in HEP and progression per patient tolerance, in order to prevent re-injury. 2. Patient will have a decrease in pain to 0-2/10 to facilitate improvement in movement, function, and ADLs as indicated by Functional Deficits.     Long Term Goals: To be achieved in: 12 weeks  1. Functional Disability index score of 20% or less for the [facility] to assist with reaching prior level of function.    2. Patient will demonstrate increased R hip AROM to 120 flex, 10 extn, 30 IR, 40 ER to allow for proper joint functioning as indicated by patients Functional Deficits. 3. Patient will demonstrate an increase in R hip strength to 4+/5 hip abd and extn and knee flex/extn to 5/5 to allow for proper functional mobility as indicated by patients Functional Deficits. 4. Patient will return to full duty work without increased symptoms or restriction. 5. 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 [] Progression is slowed due to complexities listed. [] Progression has been slowed due to co-morbidities. [x] Plan just implemented, too soon to assess goals progression  [] Other:     ASSESSMENT:      Treatment/Activity Tolerance:  [x] Patient tolerated treatment well [] Patient limited by fatique  [] Patient limited by pain  [] Patient limited by other medical complications  [x] Other:  Pt was able to get further with hip flex with seated sheet pulls today but was still tight with manual PROM of hip at about 90 degrees flex. Pt is having less pain overall each visit and improving in LE mobility. He did fine with abd isometric today and is doing well with all of his isometrics.      Prognosis: [x] Good [] Fair  [] Poor    Patient Requires Follow-up: [x] Yes  [] No    PLAN: See eval; send PT notes each week; PROM of hip  [x] Continue per plan of care [] Alter current plan (see comments)  [] Plan of care initiated [] Hold pending MD visit [] Discharge    Electronically signed by: Katie Aguilar PT, DPT

## 2018-02-09 ENCOUNTER — OFFICE VISIT (OUTPATIENT)
Dept: ORTHOPEDIC SURGERY | Age: 38
End: 2018-02-09

## 2018-02-09 VITALS
BODY MASS INDEX: 24 KG/M2 | HEIGHT: 69 IN | SYSTOLIC BLOOD PRESSURE: 126 MMHG | WEIGHT: 162.04 LBS | DIASTOLIC BLOOD PRESSURE: 81 MMHG | HEART RATE: 87 BPM

## 2018-02-09 DIAGNOSIS — M25.551 RIGHT HIP PAIN: Primary | ICD-10-CM

## 2018-02-09 PROCEDURE — 99024 POSTOP FOLLOW-UP VISIT: CPT | Performed by: ORTHOPAEDIC SURGERY

## 2018-02-13 ENCOUNTER — HOSPITAL ENCOUNTER (OUTPATIENT)
Dept: PHYSICAL THERAPY | Age: 38
Discharge: HOME | End: 2018-02-14
Admitting: ORTHOPAEDIC SURGERY

## 2018-02-13 NOTE — FLOWSHEET NOTE
isometric grey TB 10 x 10 secs     Ball Squeezes 10 x 10 secs      Patellar Glides      Medial      Superior      Inferior            ROM      Passive      Active      Weight Shift      Hang Weights      Sheet Pulls      Ankle Pumps            CKC      Calf raises      Wall sits      Step ups      1 leg stand      Squatting      CC TKE      Balance      Monster Walks      Bridging      Triple threats      Stool Scoots      PRE      Extension   RANGE:   Flexion   RANGE:         Cable Column            Leg Press   RANGE:         Bike X 5 mins upright ROM     Treadmill              Patient education: Pt was educated on PT diagnosis, prognosis, and plan of care. Pt was educated on the use of ice throughout the day. Pt was educated on signs/symptoms of infection and DVT and to call with any questions or concerns. Pt educated on post-op dressings, DONA hose, and use of crutches/AD. Therapeutic Exercise and NMR EXR  [x] (90650) Provided verbal/tactile cueing for activities related to strengthening, flexibility, endurance, ROM for improvements in LE, proximal hip, and core control with self care, mobility, lifting, ambulation.  [] (61261) Provided verbal/tactile cueing for activities related to improving balance, coordination, kinesthetic sense, posture, motor skill, proprioception  to assist with LE, proximal hip, and core control in self care, mobility, lifting, ambulation and eccentric single leg control.      NMR and Therapeutic Activities:    [x] (47173 or 90983) Provided verbal/tactile cueing for activities related to improving balance, coordination, kinesthetic sense, posture, motor skill, proprioception and motor activation to allow for proper function of core, proximal hip and LE with self care and ADLs  [] (06965) Gait Re-education- Provided training and instruction to the patient for proper LE, core and proximal hip recruitment and positioning and eccentric body weight control with ambulation re-education in pain to 0-2/10 to facilitate improvement in movement, function, and ADLs as indicated by Functional Deficits.     Long Term Goals: To be achieved in: 12 weeks  1. Functional Disability index score of 20% or less for the [facility] to assist with reaching prior level of function. 2. Patient will demonstrate increased R hip AROM to 120 flex, 10 extn, 30 IR, 40 ER to allow for proper joint functioning as indicated by patients Functional Deficits. 3. Patient will demonstrate an increase in R hip strength to 4+/5 hip abd and extn and knee flex/extn to 5/5 to allow for proper functional mobility as indicated by patients Functional Deficits. 4. Patient will return to full duty work without increased symptoms or restriction. 5. 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 [] Progression is slowed due to complexities listed. [] Progression has been slowed due to co-morbidities. [x] Plan just implemented, too soon to assess goals progression  [] Other:     ASSESSMENT:      Treatment/Activity Tolerance:  [x] Patient tolerated treatment well [] Patient limited by fatique  [] Patient limited by pain  [] Patient limited by other medical complications  [x] Other:  Incorporated more stretches today as instructed by MD to do and he was most sore with figure 4 and hip flexor stretch. With PROM of his hip he has pain in anterior hip/groing with FADIR position and pain on outside of hip with neutral adduction position. Therefore kept in pain-free range with this. He feels pressure in anterior hip with glute squeeze but otherwise tolerated isometrics well.      Prognosis: [x] Good [] Fair  [] Poor    Patient Requires

## 2018-02-15 ENCOUNTER — HOSPITAL ENCOUNTER (OUTPATIENT)
Dept: PHYSICAL THERAPY | Age: 38
Discharge: HOME | End: 2018-02-16
Admitting: ORTHOPAEDIC SURGERY

## 2018-02-15 NOTE — FLOWSHEET NOTE
[facility] [address] [place] [address]                                                         Physical Therapy Daily Treatment Note  Date:  2/15/2018    Patient Name:  Clyde Mcneill    :  1980  MRN: [id number]    Medical/Treatment Diagnosis Information:  · Diagnosis: M25.551 R hip pain; s/p R hip arthroplasty on 18  PROCEDURE PERFORMED:  1. Right Hip arthroscopic labral debridement of macerated labral tissue    2. Right Hip arthroscopic acetabuloplasty to resect pincer lesion   3. Right Hip arthroscopic femoral head-neck osteochondroplasty to resect cam lesion  4. Right Hip arthroscopic removal of loose body / excision of os acetabuli  5. Right Hip subspine decompression  6. Right Hip acetabular chondroplasty   7. Right Hip arthroscopic synovectomy    · Treatment Diagnosis: M25.551 R knee pain; M62.81 muscle weakness  Insurance/Certification information:  PT Insurance Information: [place] insurance - 12 visits through 2018; Fax notes each week  Physician Information:  Referring Practitioner: Megan Elizalde MD  Plan of care signed (Y/N):     Date of Patient follow up with Physician: 6 weeks post op    G-Code (if applicable):      Date G-Code Applied:  2018       Progress Note: []  Yes  [x]  No  Next due by: Visit #10       Latex Allergy:  [x]NO      []YES  Preferred Language for Healthcare:   [x]English       []other:    Visit # Insurance Allowable   5 12 auth through 2018- also sent off request/faxed notes after 1st visit     Pain level:  0/10 On 2/15/2018     SUBJECTIVE:   Pt reports he is feeling good and has no pain. He is feeling good.      OBJECTIVE: See eval  Observation:   Test measurements:    Joint mobility: n/t              []Normal               []Hypo              []Hyper     Palpation: n/t     Functional Mobility/Transfers: B crutches     Posture: Triple threats      Stool Scoots      PRE      Extension   RANGE:   Flexion   RANGE:         Cable Column            Leg Press   RANGE:         Bike X 10 mins upright ROM     Treadmill              Patient education: Pt was educated on PT diagnosis, prognosis, and plan of care. Pt was educated on the use of ice throughout the day. Pt was educated on signs/symptoms of infection and DVT and to call with any questions or concerns. Pt educated on post-op dressings, DONA hose, and use of crutches/AD. Therapeutic Exercise and NMR EXR  [x] (20829) Provided verbal/tactile cueing for activities related to strengthening, flexibility, endurance, ROM for improvements in LE, proximal hip, and core control with self care, mobility, lifting, ambulation.  [] (85314) Provided verbal/tactile cueing for activities related to improving balance, coordination, kinesthetic sense, posture, motor skill, proprioception  to assist with LE, proximal hip, and core control in self care, mobility, lifting, ambulation and eccentric single leg control. NMR and Therapeutic Activities:    [x] (97755 or 33627) Provided verbal/tactile cueing for activities related to improving balance, coordination, kinesthetic sense, posture, motor skill, proprioception and motor activation to allow for proper function of core, proximal hip and LE with self care and ADLs  [] (63113) Gait Re-education- Provided training and instruction to the patient for proper LE, core and proximal hip recruitment and positioning and eccentric body weight control with ambulation re-education including up and down stairs     Home Exercise Program:  Pt was instructed in, and safely and correctly demonstrated home exercise program. Patient verbalized understanding of proper frequency of exercises. Copy of exercises was scanned into patient chart and can be found in the media file.   Updated as above on 2/6/18  [x] [phone]) Reviewed/Progressed HEP activities related to strengthening, flexibility, endurance, ROM of core, proximal hip and LE for functional self-care, mobility, lifting and ambulation/stair navigation   [] (78615)Reviewed/Progressed HEP activities related to improving balance, coordination, kinesthetic sense, posture, motor skill, proprioception of core, proximal hip and LE for self care, mobility, lifting, and ambulation/stair navigation      Manual Treatments:  PROM / STM / Oscillations-Mobs:  G-I, II, III, IV (PA's, Inf., Post.)  [x] (83179) Provided manual therapy to mobilize LE, proximal hip and/or LS spine soft tissue/joints for the purpose of modulating pain, promoting relaxation,  increasing ROM, reducing/eliminating soft tissue swelling/inflammation/restriction, improving soft tissue extensibility and allowing for proper ROM for normal function with self care, mobility, lifting and ambulation. PROM of R hip into hip flex x 30,  Hip circles CW/CCW in neutral x 30, hip circles knee bent to 90 flex CW/CCW x 30, supine abd x 30, ER/IR with knee flexed to 90 degrees x 30; sidelying hip extn x 30     Modalities:  will ice at home    Charges:  Timed Code Treatment Minutes: 31   Total Treatment Minutes: 40     [] EVAL (LOW) 06572   [] EVAL (MOD) 18158   [] EVAL (HIGH) 53833   [] RE-EVAL   [] KV(29875) x      [] IONTO  [] NMR (66123) x      [] VASO  [x] Manual (70634) x  1    [] Other:  [x] TA x  1    [] Mech Traction (31669)  [] ES(attended) (82099)      [] ES (un) (42068):     GOALS: Short Term Goals: To be achieved in: 2 weeks  1. Independent in HEP and progression per patient tolerance, in order to prevent re-injury. 2. Patient will have a decrease in pain to 0-2/10 to facilitate improvement in movement, function, and ADLs as indicated by Functional Deficits.     Long Term Goals: To be achieved in: 12 weeks  1. Functional Disability index score of 20% or less for the [facility] to assist with reaching prior level of function.    2. Patient will demonstrate increased R hip AROM to 120 flex, 10 extn, 30 IR, 40 ER to allow for proper joint functioning as indicated by patients Functional Deficits. 3. Patient will demonstrate an increase in R hip strength to 4+/5 hip abd and extn and knee flex/extn to 5/5 to allow for proper functional mobility as indicated by patients Functional Deficits. 4. Patient will return to full duty work without increased symptoms or restriction. 5. 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 [] Progression is slowed due to complexities listed. [] Progression has been slowed due to co-morbidities. [x] Plan just implemented, too soon to assess goals progression  [] Other:     ASSESSMENT:      Treatment/Activity Tolerance:  [x] Patient tolerated treatment well [] Patient limited by fatique  [] Patient limited by pain  [] Patient limited by other medical complications  [x] Other:  Pt showed improved motion in flex with SKTC and PROM of hip stretching today. Also more abd motion. He was tight in both ER/IR limited to about 10-20 degrees each as this is the first time stretching PROM in these directions. He was a little fatigued by increased time on bike today. He is tolerated strength/isometrics better.      Prognosis: [x] Good [] Fair  [] Poor    Patient Requires Follow-up: [x] Yes  [] No    PLAN: See eval; send PT notes each week; consider PRE knee extn, clams  [x] Continue per plan of care [] Alter current plan (see comments)  [] Plan of care initiated [] Hold pending MD visit [] Discharge    Electronically signed by: Miguel Zambrano PT, DPT

## 2018-02-20 ENCOUNTER — HOSPITAL ENCOUNTER (OUTPATIENT)
Dept: PHYSICAL THERAPY | Age: 38
Discharge: HOME | End: 2018-02-21
Admitting: ORTHOPAEDIC SURGERY

## 2018-02-20 NOTE — FLOWSHEET NOTE
[address]                                                         Physical Therapy Daily Treatment Note  Date:  2018    Patient Name:  Charan Hawkins    :  1980  MRN: [id number]    Medical/Treatment Diagnosis Information:  · Diagnosis: M25.551 R hip pain; s/p R hip arthroplasty on 18  PROCEDURE PERFORMED:  1. Right Hip arthroscopic labral debridement of macerated labral tissue    2. Right Hip arthroscopic acetabuloplasty to resect pincer lesion   3. Right Hip arthroscopic femoral head-neck osteochondroplasty to resect cam lesion  4. Right Hip arthroscopic removal of loose body / excision of os acetabuli  5. Right Hip subspine decompression  6. Right Hip acetabular chondroplasty   7. Right Hip arthroscopic synovectomy    · Treatment Diagnosis: M25.551 R knee pain; M62.81 muscle weakness  Insurance/Certification information:  PT Insurance Information: [place] insurance - 12 visits through 2018; Fax notes each week  Physician Information:  Referring Practitioner: Alpesh Damon MD  Plan of care signed (Y/N):     Date of Patient follow up with Physician: 6 weeks post op    G-Code (if applicable):      Date G-Code Applied:  2018       Progress Note: []  Yes  [x]  No  Next due by: Visit #10       Latex Allergy:  [x]NO      []YES  Preferred Language for Healthcare:   [x]English       []other:    Visit # Insurance Allowable   6 12 auth through 2018- also sent off request/faxed notes after 1st visit     Pain level:  0/10 On 2018     SUBJECTIVE:   Pt reports he feels good in his hip still. Pain is up to 2/10 at most. He reports MD told him he no longer needs brace at this point but is wearing it as it feels like it is safer with it in. He feels like his hip is going to pop out sometimes such as with his glute squeeze.      OBJECTIVE: See eval  Observation:   Test measurements:    Joint mobility: n/t              []Normal               []Hypo              []Hyper     Palpation: n/t     Functional Mobility/Transfers: B crutches     Posture: n/t     Bandages/Dressings/Incisions:     Gait: (include devices/WB status) educated pt on FFWB (20%) with B crutches      Single leg stance: n/t     Squats: n/t                   ROM PROM AROM Comments     Left Right Left Right     Flexion   105 flex with sheet pulls         Extension             Abduction             Adduction             ER             IR                              Strength- deferred due to recent surgery Left Right Comments   Hip flexors         Hip extension         Hip abduction         Hip adduction         Hip ER         Hip IR         Quads         Hamstrings            Flexibility Left Right Comments   Hamstrings   poor     ITB (Obers test)         Hip flexor(Osmin test)         gastroc   poor     Rectus femoris(Elys test)                       Functional assessment on 2/1/2018  Functional Assessment Tool Used: LEFS  Score: 8.75%        RESTRICTIONS/PRECAUTIONS: per script scanned into Media- 3 weeks FFWB    Exercises/Interventions:     Exercise/Equipment Resistance Repetitions Other comments   Stretching      Hamstring 5 x 30 secs     Hip Flexor 5 x 30 secs off edge of bed     ITB- Rope      Grion      Quad 5 x 30 secs     Inclined Calf      Towel Pull      Piriformis      Figure 4 5 x 30 secs     SKTC 10 x 10 secs     Hip adductor No stretch felt and good range so not needed HEP    SLR      Supine      Prone      Abduction      Adducton      SLR+      clams 3 x 10      Standing knee flex      Isometrics      Quad sets 10 x 10 secs     glute set 10 x 10 secs     HS set 10 x 10 secs      TrA 10 x 10 secs     abd isometric grey TB 10 x 10 secs     Ball Squeezes 10 x 10 secs      Patellar Glides      Medial      Superior      Inferior            ROM      Passive      Active      Weight Shift      [place] Sheet Pulls      Ankle Pumps            CKC      Calf raises      Wall sits      Step ups      1 leg stand      Squatting      CC TKE      Balance      Monster Walks      Bridging      Triple threats      Stool Scoots      PRE      Extension 3 x 10 45 lbs  RANGE:   Flexion 3 x 10 45 lbs  RANGE:         Cable Column            Leg Press   RANGE:         Bike X 10 mins upright ROM     Treadmill              Patient education: Pt was educated on PT diagnosis, prognosis, and plan of care. Pt was educated on the use of ice throughout the day. Pt was educated on signs/symptoms of infection and DVT and to call with any questions or concerns. Pt educated on post-op dressings, DONA hose, and use of crutches/AD. Therapeutic Exercise and NMR EXR  [x] (41333) Provided verbal/tactile cueing for activities related to strengthening, flexibility, endurance, ROM for improvements in LE, proximal hip, and core control with self care, mobility, lifting, ambulation.  [] (16857) Provided verbal/tactile cueing for activities related to improving balance, coordination, kinesthetic sense, posture, motor skill, proprioception  to assist with LE, proximal hip, and core control in self care, mobility, lifting, ambulation and eccentric single leg control.      NMR and Therapeutic Activities:    [x] (46269 or 83028) Provided verbal/tactile cueing for activities related to improving balance, coordination, kinesthetic sense, posture, motor skill, proprioception and motor activation to allow for proper function of core, proximal hip and LE with self care and ADLs  [] (68406) Gait Re-education- Provided training and instruction to the patient for proper LE, core and proximal hip recruitment and positioning and eccentric body weight control with ambulation re-education including up and down stairs     Home Exercise Program:  Pt was instructed in, and safely and correctly demonstrated home exercise program. Patient verbalized understanding of proper frequency of exercises. Copy of exercises was scanned into patient chart and can be found in the media file. Updated as above on 2/6/18  [x] (49477) Reviewed/Progressed HEP activities related to strengthening, flexibility, endurance, ROM of core, proximal hip and LE for functional self-care, mobility, lifting and ambulation/stair navigation   [] (41802)Reviewed/Progressed HEP activities related to improving balance, coordination, kinesthetic sense, posture, motor skill, proprioception of core, proximal hip and LE for self care, mobility, lifting, and ambulation/stair navigation      Manual Treatments:  PROM / STM / Oscillations-Mobs:  G-I, II, III, IV (PA's, Inf., Post.)  [x] (58055) Provided manual therapy to mobilize LE, proximal hip and/or LS spine soft tissue/joints for the purpose of modulating pain, promoting relaxation,  increasing ROM, reducing/eliminating soft tissue swelling/inflammation/restriction, improving soft tissue extensibility and allowing for proper ROM for normal function with self care, mobility, lifting and ambulation. PROM of R hip into hip flex x 30,  Hip circles CW/CCW in neutral x 30, hip circles knee bent to 90 flex CW/CCW x 30, supine abd x 30, ER/IR with knee flexed to 90 degrees x 30; Modalities:  will ice at home    Charges:  Timed Code Treatment Minutes: 40   Total Treatment Minutes: 60     [] EVAL (LOW) 73376   [] EVAL (MOD) 66813   [] EVAL (HIGH) 95277   [] RE-EVAL   [x] EI(73502) x  1   [] IONTO  [] NMR (55484) x      [] VASO  [x] Manual (61218) x  1    [] Other:  [x] TA x  1    [] Mech Traction (82364)  [] ES(attended) (13499)      [] ES (un) (33980):     GOALS: Short Term Goals: To be achieved in: 2 weeks  1. Independent in HEP and progression per patient tolerance, in order to prevent re-injury. 2. Patient will have a decrease in pain to 0-2/10 to facilitate improvement in movement, function, and ADLs as indicated by Functional Deficits.     Long Term Goals:  To

## 2018-02-22 ENCOUNTER — HOSPITAL ENCOUNTER (OUTPATIENT)
Dept: PHYSICAL THERAPY | Age: 38
Discharge: HOME | End: 2018-02-23
Admitting: ORTHOPAEDIC SURGERY

## 2018-02-22 NOTE — FLOWSHEET NOTE
[facility] [address] [place] [address]                                                         Physical Therapy Daily Treatment Note  Date:  2018    Patient Name:  Stephanie Camarena    :  1980  MRN: [id number]    Medical/Treatment Diagnosis Information:  · Diagnosis: M25.551 R hip pain; s/p R hip arthroplasty on 18  PROCEDURE PERFORMED:  1. Right Hip arthroscopic labral debridement of macerated labral tissue    2. Right Hip arthroscopic acetabuloplasty to resect pincer lesion   3. Right Hip arthroscopic femoral head-neck osteochondroplasty to resect cam lesion  4. Right Hip arthroscopic removal of loose body / excision of os acetabuli  5. Right Hip subspine decompression  6. Right Hip acetabular chondroplasty   7. Right Hip arthroscopic synovectomy    · Treatment Diagnosis: M25.551 R knee pain; M62.81 muscle weakness  Insurance/Certification information:  PT Insurance Information: [facility] insurance - 12 visits through 2018; Fax notes each week  Physician Information:  Referring Practitioner: Stas Goetz MD  Plan of care signed (Y/N):     Date of Patient follow up with Physician: 6 weeks post op    G-Code (if applicable):      Date G-Code Applied:  2018       Progress Note: []  Yes  [x]  No  Next due by: Visit #10       Latex Allergy:  [x]NO      []YES  Preferred Language for Healthcare:   [x]English       []other:    Visit # Insurance Allowable   7 12 auth through 2018- also sent off request/faxed notes after 1st visit     Pain level:  0/10 On 2018     SUBJECTIVE:   Pt felt good after last visit. He is no longer wearing his hip brace.      OBJECTIVE: See eval  Observation:   Test measurements:    Joint mobility: n/t              []Normal               []Hypo              []Hyper     Palpation: n/t     Functional Mobility/Transfers: B crutches     Posture: 1 leg stand      Squatting      CC TKE      Balance      Monster Walks      Bridging      Triple threats      Stool Scoots      PRE      Extension 3 x 10 75, 145, 145 lbs  RANGE:   Flexion 3 x 10 65 lbs  RANGE:         Cable Column            Leg Press   RANGE:         Bike X 10 mins upright ROM     Treadmill      Gait training 50% Wbing with B crutches after biodex WS       Patient education: Pt was educated on PT diagnosis, prognosis, and plan of care. Pt was educated on the use of ice throughout the day. Pt was educated on signs/symptoms of infection and DVT and to call with any questions or concerns. Pt educated on post-op dressings, DONA hose, and use of crutches/AD. Therapeutic Exercise and NMR EXR  [x] (62596) Provided verbal/tactile cueing for activities related to strengthening, flexibility, endurance, ROM for improvements in LE, proximal hip, and core control with self care, mobility, lifting, ambulation.  [] (73453) Provided verbal/tactile cueing for activities related to improving balance, coordination, kinesthetic sense, posture, motor skill, proprioception  to assist with LE, proximal hip, and core control in self care, mobility, lifting, ambulation and eccentric single leg control.      NMR and Therapeutic Activities:    [x] (64498 or 87835) Provided verbal/tactile cueing for activities related to improving balance, coordination, kinesthetic sense, posture, motor skill, proprioception and motor activation to allow for proper function of core, proximal hip and LE with self care and ADLs  [] (81594) Gait Re-education- Provided training and instruction to the patient for proper LE, core and proximal hip recruitment and positioning and eccentric body weight control with ambulation re-education including up and down stairs     Home Exercise Program:  Pt was instructed in, and safely and correctly demonstrated home exercise program. Patient verbalized understanding of proper frequency of exercises. Copy of exercises was scanned into patient chart and can be found in the media file. Updated as above on 2/6/18  [x] (14267) Reviewed/Progressed HEP activities related to strengthening, flexibility, endurance, ROM of core, proximal hip and LE for functional self-care, mobility, lifting and ambulation/stair navigation   [] (87286)Reviewed/Progressed HEP activities related to improving balance, coordination, kinesthetic sense, posture, motor skill, proprioception of core, proximal hip and LE for self care, mobility, lifting, and ambulation/stair navigation      Manual Treatments:  PROM / STM / Oscillations-Mobs:  G-I, II, III, IV (PA's, Inf., Post.)  [x] (11724) Provided manual therapy to mobilize LE, proximal hip and/or LS spine soft tissue/joints for the purpose of modulating pain, promoting relaxation,  increasing ROM, reducing/eliminating soft tissue swelling/inflammation/restriction, improving soft tissue extensibility and allowing for proper ROM for normal function with self care, mobility, lifting and ambulation. PROM of R hip into hip flex x 30,  Hip circles CW/CCW in neutral x 30, hip circles knee bent to 90 flex CW/CCW x 30, supine abd x 30, ER/IR with knee flexed to 90 degrees x 30; sidelying hip extn x 30     Modalities:  will ice at home    Charges:  Timed Code Treatment Minutes: 55   Total Treatment Minutes: 62     [] EVAL (LOW) 72429   [] EVAL (MOD) 60681   [] EVAL (HIGH) 79643   [] RE-EVAL   [x] QO(06980) x  1   [] IONTO  [x] NMR (27769) x  1   [] VASO  [x] Manual (01371) x  1    [] Other:  [x] TA x  1    [] Mech Traction (06957)  [] ES(attended) (06390)      [] ES (un) (65575):     GOALS: Short Term Goals: To be achieved in: 2 weeks  1. Independent in HEP and progression per patient tolerance, in order to prevent re-injury.    2. Patient will have a decrease in pain to 0-2/10 to facilitate improvement in movement, function, and ADLs as indicated by Functional Deficits.     Long Term Goals: To be achieved in: 12 weeks  1. Functional Disability index score of 20% or less for the [facility] to assist with reaching prior level of function. 2. Patient will demonstrate increased R hip AROM to 120 flex, 10 extn, 30 IR, 40 ER to allow for proper joint functioning as indicated by patients Functional Deficits. 3. Patient will demonstrate an increase in R hip strength to 4+/5 hip abd and extn and knee flex/extn to 5/5 to allow for proper functional mobility as indicated by patients Functional Deficits. 4. Patient will return to full duty work without increased symptoms or restriction. 5. 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 [] Progression is slowed due to complexities listed. [] Progression has been slowed due to co-morbidities. [x] Plan just implemented, too soon to assess goals progression  [] Other:     ASSESSMENT:      Treatment/Activity Tolerance:  [x] Patient tolerated treatment well [] Patient limited by fatique  [] Patient limited by pain  [] Patient limited by other medical complications  [x] Other:  Pt did well walking with 50% WBing with B crutches with no pain. He also tolerated hip extn well and was able to increase resistance on PRE machines. He continues to be tight and painful in hip flex, ER, IR motions but these are slowly improving.      Prognosis: [x] Good [] Fair  [] Poor    Patient Requires Follow-up: [x] Yes  [] No    PLAN: See eval; send PT notes each week; 75% WBing; measure motions; stool hip rotations, bridge, standing hip abd,   [x] Continue per plan of care [] Alter current plan (see comments)  [] Plan of care initiated [] Hold pending MD visit []

## 2018-03-01 ENCOUNTER — HOSPITAL ENCOUNTER (OUTPATIENT)
Dept: PHYSICAL THERAPY | Age: 38
Discharge: HOME | End: 2018-03-02
Admitting: ORTHOPAEDIC SURGERY

## 2018-03-01 ENCOUNTER — HOSPITAL ENCOUNTER (OUTPATIENT)
Dept: OTHER | Age: 38
Discharge: HOME | End: 2018-03-31
Attending: ORTHOPAEDIC SURGERY | Admitting: ORTHOPAEDIC SURGERY
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
## 2018-03-06 ENCOUNTER — HOSPITAL ENCOUNTER (OUTPATIENT)
Dept: PHYSICAL THERAPY | Age: 38
Discharge: HOME | End: 2018-03-07
Admitting: ORTHOPAEDIC SURGERY

## 2018-03-06 NOTE — FLOWSHEET NOTE
SLR      Supine      Prone hip extn 3 x 10      standign hip Abduction 3 x 10      Adducton      bridge 10 x 10 secs     SLR+      clams 3 x 10      Stool hip ER/IR X 30 ROM     Standing knee flex      Isometrics      Quad sets     glute set     HS set     TrA     abd isometric     Ball Squeezes     Patellar Glides      Medial      Superior      Inferior            ROM      Passive      Active      Weight Shift      Hang Weights      Sheet Pulls      Ankle Pumps            CKC      Calf raises      Wall sits      Step ups      1 leg stand      Squatting Mini 3 x 10      CC TKE      Balance      Monster Walks      Bridging      Triple threats      Stool Scoots      PRE      Extension 3 x 10 150 lbs DL  RANGE:   Flexion 3 x 10 70 lbs DL  RANGE:         Cable Column            Leg Press 3 x 10 200 lbs DL  RANGE:         Bike X 10 mins upright ROM     Treadmill      Gait training Educated on full WBIng - tendency to circumduct R LE so cued to increase knee and hip flex and avoid slight trendelenburg. crutch Cues to increase arm swing B      Patient education: Pt was educated on PT diagnosis, prognosis, and plan of care. Pt was educated on the use of ice throughout the day. Pt was educated on signs/symptoms of infection and DVT and to call with any questions or concerns. Pt educated on post-op dressings, DONA hose, and use of crutches/AD. Therapeutic Exercise and NMR EXR  [x] (95548) Provided verbal/tactile cueing for activities related to strengthening, flexibility, endurance, ROM for improvements in LE, proximal hip, and core control with self care, mobility, lifting, ambulation.  [] (73058) Provided verbal/tactile cueing for activities related to improving balance, coordination, kinesthetic sense, posture, motor skill, proprioception  to assist with LE, proximal hip, and core control in self care, mobility, lifting, ambulation and eccentric single leg control.      NMR and Therapeutic Activities:    [x] (13147 or ) Provided verbal/tactile cueing for activities related to improving balance, coordination, kinesthetic sense, posture, motor skill, proprioception and motor activation to allow for proper function of core, proximal hip and LE with self care and ADLs  [] (25243) Gait Re-education- Provided training and instruction to the patient for proper LE, core and proximal hip recruitment and positioning and eccentric body weight control with ambulation re-education including up and down stairs     Home Exercise Program:  Pt was instructed in, and safely and correctly demonstrated home exercise program. Patient verbalized understanding of proper frequency of exercises. Copy of exercises was scanned into patient chart and can be found in the media file. Updated as above on 2/6/18  [x] (37461) Reviewed/Progressed HEP activities related to strengthening, flexibility, endurance, ROM of core, proximal hip and LE for functional self-care, mobility, lifting and ambulation/stair navigation   [] (49713)Reviewed/Progressed HEP activities related to improving balance, coordination, kinesthetic sense, posture, motor skill, proprioception of core, proximal hip and LE for self care, mobility, lifting, and ambulation/stair navigation      Manual Treatments:  PROM / STM / Oscillations-Mobs:  G-I, II, III, IV (PA's, Inf., Post.)  [x] (60317) Provided manual therapy to mobilize LE, proximal hip and/or LS spine soft tissue/joints for the purpose of modulating pain, promoting relaxation,  increasing ROM, reducing/eliminating soft tissue swelling/inflammation/restriction, improving soft tissue extensibility and allowing for proper ROM for normal function with self care, mobility, lifting and ambulation. PROM of R hip into hip flex x 30,  Hip circles CW/CCW in neutral x 30, hip circles knee bent to 90 flex CW/CCW x 30, supine abd x 30, ER/IR with knee flexed to 90 degrees x 30; prone hip extn x 30.  Gx 10 reps    Modalities:  will well [] Patient limited by fatique  [] Patient limited by pain  [] Patient limited by other medical complications  [x] Other:  Pt continues to have some pain in anterior hip that increased with hip ER, IR, flex and glute squeeze. He is improving in motion of his hip though and doing well with HEP. He does well with strengthening exercises in session. He does continue to circumduct his LE with ambulation and was cues to increased toe off and hip flex to avoid this and to increase B arm swing. Due to his longer history of hip pain, he will require a longer rehab to get pain-free and to reach PT goals.      Prognosis: [x] Good [] Fair  [] Poor    Patient Requires Follow-up: [x] Yes  [] No    PLAN: See eval; send PT notes each week; 75% WBing- 100% WBing GT  [x] Continue per plan of care [] Alter current plan (see comments)  [] Plan of care initiated [] Hold pending MD visit [] Discharge    Electronically signed by: Miguel Zambrano PT, DPT

## 2018-03-08 ENCOUNTER — HOSPITAL ENCOUNTER (OUTPATIENT)
Dept: PHYSICAL THERAPY | Age: 38
Discharge: HOME | End: 2018-03-09
Admitting: ORTHOPAEDIC SURGERY

## 2018-03-08 NOTE — FLOWSHEET NOTE
[address] [facility] [address], [place]                                                         Physical Therapy Daily Treatment Note  Date:  3/8/2018    Patient Name:  Francy Pettit    :  1980  MRN: [id number]    Medical/Treatment Diagnosis Information:  · Diagnosis: M25.551 R hip pain; s/p R hip arthroplasty on 18  PROCEDURE PERFORMED:  1. Right Hip arthroscopic labral debridement of macerated labral tissue    2. Right Hip arthroscopic acetabuloplasty to resect pincer lesion   3. Right Hip arthroscopic femoral head-neck osteochondroplasty to resect cam lesion  4. Right Hip arthroscopic removal of loose body / excision of os acetabuli  5. Right Hip subspine decompression  6. Right Hip acetabular chondroplasty   7. Right Hip arthroscopic synovectomy    · Treatment Diagnosis: M25.551 R knee pain; M62.81 muscle weakness  Insurance/Certification information:  PT Insurance Information: [place] insurance - 12 visits through 2018; Fax notes each week  Physician Information:  Referring Practitioner: Edwar Waller MD  Plan of care signed (Y/N): Y    Date of Patient follow up with Physician: 6 weeks post op    G-Code (if applicable):      Date G-Code Applied:  3/6/2018       Progress Note: []  Yes  [x]  No  Next due by: Visit #10       Latex Allergy:  [x]NO      []YES  Preferred Language for Healthcare:   [x]English       []other:    Visit # Insurance Allowable   11 12 auth through 2018- also sent off request/faxed notes after 1st visit     Pain level:  3/10 On 3/8/2018     SUBJECTIVE:    Pt reports that after last session he had increased pain in all of his joints- hips, knees, shoulders elbows. He felt sore and shaky Tu evening and into Wednesday. He had no fever but rested and hydrated. He did not do his exercises yesterday due to not feeling well.  He reports his hip feels about the same today as it (HIGH) 42637   [] RE-EVAL   [x] LK(58367) x  2   [] IONTO  [] NMR (81952) x      [] VASO  [x] Manual (69385) x  1    [] Other:  [x] TA x  1    [] Mech Traction (91368)  [] ES(attended) (19225)      [] ES (un) (14534):     GOALS: Short Term Goals: To be achieved in: 2 weeks  1. Independent in HEP and progression per patient tolerance, in order to prevent re-injury.- MET   2. Patient will have a decrease in pain to 0-2/10 to facilitate improvement in movement, function, and ADLs as indicated by Functional Deficits. - progressing     Long Term Goals: To be achieved in: 12 weeks- NOT MET, progressing  1. Functional Disability index score of 80% or more for the [facility] to assist with reaching prior level of function. 2. Patient will demonstrate increased R hip AROM to 120 flex, 10 extn, 30 IR, 40 ER to allow for proper joint functioning as indicated by patients Functional Deficits. 3. Patient will demonstrate an increase in R hip strength to 4+/5 hip abd and extn and knee flex/extn to 5/5 to allow for proper functional mobility as indicated by patients Functional Deficits. 4. Patient will return to full duty work without increased symptoms or restriction. 5. 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 [] Progression is slowed due to complexities listed. [] Progression has been slowed due to co-morbidities.   [x] Plan just implemented, too soon to assess goals progression  [] Other:     ASSESSMENT:      Treatment/Activity Tolerance:  [x] Patient tolerated treatment well [] Patient limited by fatique  [] Patient limited by pain  [] Patient limited by other medical complications  [x] Other: Pt was feeling better today than he did the 2 days prior. He was able to do all exercises that were performed last session. He continues to have most pain with rotation motions. He seems to be progressing with motion of his hip but slowly likely due years of pain and his level of scar tissue in his hip during surgery. Pt sees MD next week prior to PT appt. He continues to have somewhat antalgic gait with hip circumduction so will continue use of single crutch. Prognosis: [x] Good [] Fair  [] Poor    Patient Requires Follow-up: [x] Yes  [] No    PLAN: See eval; send PT notes each week; 75% WBing- 100% WBing GT; get auth for more visits.    [x] Continue per plan of care [] Alter current plan (see comments)  [] Plan of care initiated [] Hold pending MD visit [] Discharge    Electronically signed by: Jade Mathews PT, DPT

## 2018-03-13 ENCOUNTER — HOSPITAL ENCOUNTER (OUTPATIENT)
Dept: PHYSICAL THERAPY | Age: 38
Discharge: HOME | End: 2018-03-14
Admitting: ORTHOPAEDIC SURGERY

## 2018-03-13 ENCOUNTER — OFFICE VISIT (OUTPATIENT)
Dept: ORTHOPEDIC SURGERY | Age: 38
End: 2018-03-13

## 2018-03-13 VITALS
DIASTOLIC BLOOD PRESSURE: 83 MMHG | HEART RATE: 83 BPM | HEIGHT: 69 IN | BODY MASS INDEX: 23.99 KG/M2 | WEIGHT: 162 LBS | SYSTOLIC BLOOD PRESSURE: 135 MMHG

## 2018-03-13 DIAGNOSIS — Z98.890 STATUS POST HIP SURGERY: Primary | ICD-10-CM

## 2018-03-13 PROCEDURE — 99024 POSTOP FOLLOW-UP VISIT: CPT | Performed by: ORTHOPAEDIC SURGERY

## 2018-03-13 NOTE — PROGRESS NOTES
Hip Arthroscopy Follow-up  Eddie Draper is here for follow up after right hip arthroscopic surgery. Surgery date was 6 weeks ago. Post op problems reported: none. He has been using crutches, CPM, and flatfoot weightbearing as instructed. Physical Examination:    Patient is awake, alert, and in no acute distress. The wound is healed. There is no warmth, erythema, or purulent drainage over the incision. No pain with circumduction    Motor intact L4-S1    Sensation intact L2-S1 but some lateral femoral cutaneous nerve dullness    No calf tenderness    Lower extremity warm and well perfused    Xrays:   2 views of the right hip were taken in the office today. These xrays demonstrate no fractures or dislocations. There is no violation of the joint space. There is no recurrence of the loose body    Assessment:   6 weeks status post right hip arthroscopy, large loose body removal.  Doing well. Much of the sharp pain is improved      Plan: We reviewed intra-operative findings. Continue physical therapy. Previously a physical therapy order was placed and postoperative physical therapy protocol was provided to the patient to give to the physical therapist.    Refill pain medications as needed.     Continue to wean off the crutches     Return to office at the 12 week postop time period          Yair Sykes MD  Orthopaedic Surgeon, Sports Medicine  Director, Hip Arthroscopy and [address] [facility] [address] () - [phone]

## 2018-03-13 NOTE — PLAN OF CARE
[id number]    Medical/Treatment Diagnosis Information:  · Diagnosis: M25.551 R hip pain; s/p R hip arthroplasty on 1/31/18  PROCEDURE PERFORMED:  1. Right Hip arthroscopic labral debridement of macerated labral tissue    2. Right Hip arthroscopic acetabuloplasty to resect pincer lesion   3. Right Hip arthroscopic femoral head-neck osteochondroplasty to resect cam lesion  4. Right Hip arthroscopic removal of loose body / excision of os acetabuli  5. Right Hip subspine decompression  6. Right Hip acetabular chondroplasty   7. Right Hip arthroscopic synovectomy    · Treatment Diagnosis: M25.551 R knee pain; M62.81 muscle weakness  Insurance/Certification information:  PT Insurance Information: [address] [facility] insurance - 12 visits through April 14, 2018; Fax notes each week  Physician Information:  Referring Practitioner: Turner Barnes MD  Plan of care signed (Y/N): Y    Date of Patient follow up with Physician: 4/27/18    G-Code (if applicable):      Date G-Code Applied:  3/6/2018       Progress Note: []  Yes  [x]  No  Next due by: Visit #22       Latex Allergy:  [x]NO      []YES  Preferred Language for Healthcare:   [x]English       []other:    Visit # Insurance Allowable   12 12 auth through April 14, 2018- also sent off request/faxed notes after 1st visit     Pain level:  3/10 On 3/13/2018     SUBJECTIVE:    Pt saw MD today. Said everything at incisions and x-ray is looking and healing well. Said to only stretch as tolerated, not into pain. Tomasa Passer it will just take a longer time to recover due to long history of hip pain. Gave him at least a couple more weeks off work. He is still on one crutch. He was at [facility] and walked 100 feet and had increased pain in his hip and had difficulty getting through rest of store even with grocery cart.        OBJECTIVE: 3/13/18  Observation:   Test measurements:    Joint mobility:               []Normal               [x]Hypo              []Hyper     Palpation: n/t     Functional Mobility/Transfers: Indep     Posture: n/t     Bandages/Dressings/Incisions:     Gait: (include devices/WB status) educated pt on FFWB (20%) with B crutches.  Antalgic gait with R hip circumduction without AD     Single leg stance:painful     Squats: increased pain anterior hip                   ROM PROM- 3/13/18 AROM Comments     Left Right Left Right     Flexion  121 108 flex          Extension  20  16         Abduction  48  42         Adduction             ER  46  37         IR  60  26                          Strength- 3/6/18 Left Right Comments   Hip flexors  4+       Hip extension  deferred       Hip abduction  deferred       Hip adduction         Hip ER  deferred       Hip IR  deferred       Quads  5  4+     Hamstrings  5  5        Flexibility Left Right Comments   Hamstrings   poor     ITB (Obers test)         Hip flexor(Osmin test)         gastroc   poor     Rectus femoris(Elys test)                       Functional assessment on 3/6/2018  Functional Assessment Tool Used: LEFS  Score: 33.75%        RESTRICTIONS/PRECAUTIONS: per script scanned into Media- 3 weeks FFWB    Exercises/Interventions:     Exercise/Equipment Resistance Repetitions Other comments   Stretching      Hamstring 5 x 30 secs     Hip Flexor 5 x 30 secs off edge of bed     ITB- Rope      Grion      Quad 5 x 30 secs rope and propped      Inclined Calf      Towel Pull      Piriformis      Figure 4 5 x 30 secs     SKTC 10 x 10 secs           Quadruped rocking Hold 10 secs each way x 10      Hip adductor No stretch felt and good range so not needed HEP    SLR      Supine      Prone hip extn 3 x 10 knee bent     standing hip Abduction 3 x 10      Adducton      bridge 10 x 10 secs     SLR+      clams 3 x 10      Stool hip ER/IR X 30 ROM     Standing knee flex      Isometrics      Quad sets     glute set     HS set     TrA With alt marches 3 x 10 BCues for form    abd isometric     Ball Squeezes     Patellar Glides      Medial      Superior instruction to the patient for proper LE, core and proximal hip recruitment and positioning and eccentric body weight control with ambulation re-education including up and down stairs     Home Exercise Program:  Pt was instructed in, and safely and correctly demonstrated home exercise program. Patient verbalized understanding of proper frequency of exercises. Copy of exercises was scanned into patient chart and can be found in the media file. Updated as above on 2/6/18  [x] (16921) Reviewed/Progressed HEP activities related to strengthening, flexibility, endurance, ROM of core, proximal hip and LE for functional self-care, mobility, lifting and ambulation/stair navigation   [] (07649)Reviewed/Progressed HEP activities related to improving balance, coordination, kinesthetic sense, posture, motor skill, proprioception of core, proximal hip and LE for self care, mobility, lifting, and ambulation/stair navigation      Manual Treatments:  PROM / STM / Oscillations-Mobs:  G-I, II, III, IV (PA's, Inf., Post.)  [x] (29673) Provided manual therapy to mobilize LE, proximal hip and/or LS spine soft tissue/joints for the purpose of modulating pain, promoting relaxation,  increasing ROM, reducing/eliminating soft tissue swelling/inflammation/restriction, improving soft tissue extensibility and allowing for proper ROM for normal function with self care, mobility, lifting and ambulation. PROM of R hip into hip flex with distraction x 30,  Hip circles CW/CCW in neutral x 30, hip circles knee bent to 90 flex CW/CCW x 30, supine abd x 30, ER/IR with knee flexed to 90 degrees x 30;  side-lying hip extn x 30.  Gentle long leg distractionx 10 reps    Modalities:  will ice at home    Charges:  Timed Code Treatment Minutes: 53   Total Treatment Minutes: 67     [] EVAL (LOW) 48290   [] EVAL (MOD) 12673   [] EVAL (HIGH) 88293   [] RE-EVAL   [x] XH(73837) x  2   [] IONTO  [] NMR (62052) x      [] VASO  [x] Manual (36583) x  1    []

## 2018-03-15 ENCOUNTER — HOSPITAL ENCOUNTER (OUTPATIENT)
Dept: PHYSICAL THERAPY | Age: 38
Discharge: HOME | End: 2018-03-16
Admitting: ORTHOPAEDIC SURGERY

## 2018-03-15 NOTE — FLOWSHEET NOTE
[]Hyper     Palpation: n/t     Functional Mobility/Transfers: Indep     Posture: n/t     Bandages/Dressings/Incisions:     Gait: (include devices/WB status) educated pt on FFWB (20%) with B crutches.  Pt was less antalgic with ambulation in full Wbing today but did report some pain 4/10 in anterior hip. - 3/15/18     Single leg stance:painful     Squats: increased pain anterior hip                   ROM PROM- 3/13/18 AROM Comments     Left Right Left Right     Flexion  121 108 flex          Extension  20  16         Abduction  48  42         Adduction             ER  46  37         IR  60  26                          Strength- 3/6/18 Left Right Comments   Hip flexors  4+       Hip extension  deferred       Hip abduction  deferred       Hip adduction         Hip ER  deferred       Hip IR  deferred       Quads  5  4+     Hamstrings  5  5        Flexibility Left Right Comments   Hamstrings   poor     ITB (Obers test)         Hip flexor(Osmin test)         gastroc   poor     Rectus femoris(Elys test)                       Functional assessment on 3/6/2018  Functional Assessment Tool Used: LEFS  Score: 33.75%        RESTRICTIONS/PRECAUTIONS: per script scanned into Media- 3 weeks FFWB    Exercises/Interventions:     Exercise/Equipment Resistance Repetitions Other comments   Stretching      Hamstring 5 x 30 secs     Hip Flexor 5 x 30 secs off edge of bed     ITB- Rope      Grion      Quad 5 x 30 secs rope and propped      Inclined Calf      Towel Pull      Piriformis      Figure 4 5 x 30 secs     SKTC 10 x 10 secs     Half kneeling hip flexor stretch 5 x 30 secs     Quadruped rocking Hold 10 secs each way x 10      Hip adductor No stretch felt and good range so not needed HEP    SLR      Supine      Prone hip ER/IR rotations 3 x 10 each way     Prone hip extn 3 x 10 knee bent     standing hip Abduction 3 x 10      Adducton      bridge 10 x 10 secs     SLR+      clams 3 x 10      Stool hip ER/IR      Standing knee flex posture, motor skill, proprioception and motor activation to allow for proper function of core, proximal hip and LE with self care and ADLs  [] (48603) Gait Re-education- Provided training and instruction to the patient for proper LE, core and proximal hip recruitment and positioning and eccentric body weight control with ambulation re-education including up and down stairs     Home Exercise Program:  Pt was instructed in, and safely and correctly demonstrated home exercise program. Patient verbalized understanding of proper frequency of exercises. Copy of exercises was scanned into patient chart and can be found in the media file. Updated as above on 2/6/18  [x] (27817) Reviewed/Progressed HEP activities related to strengthening, flexibility, endurance, ROM of core, proximal hip and LE for functional self-care, mobility, lifting and ambulation/stair navigation   [] (46016)Reviewed/Progressed HEP activities related to improving balance, coordination, kinesthetic sense, posture, motor skill, proprioception of core, proximal hip and LE for self care, mobility, lifting, and ambulation/stair navigation      Manual Treatments:  PROM / STM / Oscillations-Mobs:  G-I, II, III, IV (PA's, Inf., Post.)  [x] (46430) Provided manual therapy to mobilize LE, proximal hip and/or LS spine soft tissue/joints for the purpose of modulating pain, promoting relaxation,  increasing ROM, reducing/eliminating soft tissue swelling/inflammation/restriction, improving soft tissue extensibility and allowing for proper ROM for normal function with self care, mobility, lifting and ambulation. PROM of R hip into hip flex with distraction x 30,  Hip circles CW/CCW in neutral x 30, hip circles knee bent to 90 flex CW/CCW x 30, supine abd x 30, ER/IR with knee flexed to 90 degrees x 30;  side-lying hip extn x 30.  Gentle long leg distractionx 10 reps    Modalities:  will ice at home    Charges:  Timed Code Treatment Minutes: 55   Total Treatment Minutes: 66     [] EVAL (LOW) 05449   [] EVAL (MOD) 82857   [] EVAL (HIGH) 51185   [] RE-EVAL   [x] TF(78596) x  2   [] IONTO  [] NMR (72521) x      [] VASO  [x] Manual (30337) x  1    [] Other:  [x] TA x  1    [] Mech Traction (09375)  [] ES(attended) (01070)      [] ES (un) (49332):     GOALS: Short Term Goals: To be achieved in: 2 weeks  1. Independent in HEP and progression per patient tolerance, in order to prevent re-injury.- MET   2. Patient will have a decrease in pain to 0-2/10 to facilitate improvement in movement, function, and ADLs as indicated by Functional Deficits. - progressing     Long Term Goals: To be achieved in: 12 weeks- NOT MET, progressing  1. Functional Disability index score of 80% or more for the LEFS to assist with reaching prior level of function. 2. Patient will demonstrate increased R hip AROM to 120 flex, 10 extn, 30 IR, 40 ER to allow for proper joint functioning as indicated by patients Functional Deficits. 3. Patient will demonstrate an increase in R hip strength to 4+/5 hip abd and extn and knee flex/extn to 5/5 to allow for proper functional mobility as indicated by patients Functional Deficits. 4. Patient will return to full duty work without increased symptoms or restriction. 5. 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 [] Progression is slowed due to complexities listed. [] Progression has been slowed due to co-morbidities.   [x] Plan just implemented, too soon to assess goals progression  [] Other:     ASSESSMENT:      Treatment/Activity Tolerance:  [x] Patient tolerated treatment well [] Patient limited by fatique  [] Patient limited by pain  [] Patient

## 2018-03-20 ENCOUNTER — HOSPITAL ENCOUNTER (OUTPATIENT)
Dept: PHYSICAL THERAPY | Age: 38
Discharge: HOME | End: 2018-03-21
Admitting: ORTHOPAEDIC SURGERY

## 2018-03-20 NOTE — FLOWSHEET NOTE
[address]                              Physical Therapy Daily Treatment Note  Date:  3/20/2018    Patient Name:  Wyatt Jones    :  1980  MRN: [id number]    Medical/Treatment Diagnosis Information:  · Diagnosis: M25.551 R hip pain; s/p R hip arthroplasty on 18  PROCEDURE PERFORMED:  1. Right Hip arthroscopic labral debridement of macerated labral tissue    2. Right Hip arthroscopic acetabuloplasty to resect pincer lesion   3. Right Hip arthroscopic femoral head-neck osteochondroplasty to resect cam lesion  4. Right Hip arthroscopic removal of loose body / excision of os acetabuli  5. Right Hip subspine decompression  6. Right Hip acetabular chondroplasty   7. Right Hip arthroscopic synovectomy    · Treatment Diagnosis: M25.551 R knee pain; M62.81 muscle weakness  Insurance/Certification information:  PT Insurance Information:  [facility] insurance - 12 visits through 2018; Fax notes each week  Physician Information:  Referring Practitioner: Rolo Man MD  Plan of care signed (Y/N): Y    Date of Patient follow up with Physician: 18    G-Code (if applicable):      Date G-Code Applied:  3/6/2018       Progress Note: []  Yes  [x]  No  Next due by: Visit #22       Latex Allergy:  [x]NO      []YES  Preferred Language for Healthcare:   [x]English       []other:    Visit # Insurance Allowable   1 12 additional visits 3/20/18 through  (used 13 priorly)     Pain level:  4/10 On 3/20/2018     SUBJECTIVE:   Pt reports that after sitting for a long time such as driving. If he gets up and walks around afterwards it causes increased pain. He reports that he had to stand for an hour and had increased pain in back and hip and leg. Pt reports that he tried to get off of floor and had a sharp pain in his hip. He was sore after last session but not excruciating.        OBJECTIVE: mobility, lifting, ambulation and eccentric single leg control. NMR and Therapeutic Activities:    [x] (74336 or 78197) Provided verbal/tactile cueing for activities related to improving balance, coordination, kinesthetic sense, posture, motor skill, proprioception and motor activation to allow for proper function of core, proximal hip and LE with self care and ADLs  [] (37821) Gait Re-education- Provided training and instruction to the patient for proper LE, core and proximal hip recruitment and positioning and eccentric body weight control with ambulation re-education including up and down stairs     Home Exercise Program:  Pt was instructed in, and safely and correctly demonstrated home exercise program. Patient verbalized understanding of proper frequency of exercises. Copy of exercises was scanned into patient chart and can be found in the media file. Updated as above on 2/6/18  [x] (54086) Reviewed/Progressed HEP activities related to strengthening, flexibility, endurance, ROM of core, proximal hip and LE for functional self-care, mobility, lifting and ambulation/stair navigation   [] (81935)Reviewed/Progressed HEP activities related to improving balance, coordination, kinesthetic sense, posture, motor skill, proprioception of core, proximal hip and LE for self care, mobility, lifting, and ambulation/stair navigation      Manual Treatments:  PROM / STM / Oscillations-Mobs:  G-I, II, III, IV (PA's, Inf., Post.)  [x] (28512) Provided manual therapy to mobilize LE, proximal hip and/or LS spine soft tissue/joints for the purpose of modulating pain, promoting relaxation,  increasing ROM, reducing/eliminating soft tissue swelling/inflammation/restriction, improving soft tissue extensibility and allowing for proper ROM for normal function with self care, mobility, lifting and ambulation.    PROM of R hip into hip flex with distraction x 30, long axis distraction x 3 mins, Hip circles CW/CCW in neutral x 30, hip circles knee bent to 90 flex CW/CCW x 30, supine abd x 30, ER/IR with knee flexed to 90 degrees x 30;  side-lying hip extn x 30. Gentle long leg distractionx 10 reps  STM to R IT band with stick and then manual posterior to greater trochanter. Modalities:  will ice at home    Charges:  Timed Code Treatment Minutes: 44   Total Treatment Minutes: 60     [] EVAL (LOW) 80076   [] EVAL (MOD) 44089   [] EVAL (HIGH) 47779   [] RE-EVAL   [x] YV(51952) x  1   [] IONTO  [] NMR (01833) x      [] VASO  [x] Manual (01668) x  1    [] Other:  [x] TA x  1    [] Mech Traction (03540)  [] ES(attended) (67340)      [] ES (un) (89104):     GOALS: Short Term Goals: To be achieved in: 2 weeks  1. Independent in HEP and progression per patient tolerance, in order to prevent re-injury.- MET   2. Patient will have a decrease in pain to 0-2/10 to facilitate improvement in movement, function, and ADLs as indicated by Functional Deficits. - progressing     Long Term Goals: To be achieved in: 12 weeks- NOT MET, progressing  1. Functional Disability index score of 80% or more for the LEFS to assist with reaching prior level of function. 2. Patient will demonstrate increased R hip AROM to 120 flex, 10 extn, 30 IR, 40 ER to allow for proper joint functioning as indicated by patients Functional Deficits. 3. Patient will demonstrate an increase in R hip strength to 4+/5 hip abd and extn and knee flex/extn to 5/5 to allow for proper functional mobility as indicated by patients Functional Deficits. 4. Patient will return to full duty work without increased symptoms or restriction. 5. 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     []

## 2018-03-22 ENCOUNTER — HOSPITAL ENCOUNTER (OUTPATIENT)
Dept: PHYSICAL THERAPY | Age: 38
Discharge: HOME | End: 2018-03-23
Admitting: ORTHOPAEDIC SURGERY

## 2018-03-22 NOTE — FLOWSHEET NOTE
x 10      Adducton      bridge 10 x 10 secs     SLR+      clams 3 x 10   Mild discomfort   Stool hip ER/IR      Standing knee flex      Isometrics      Quad sets     glute set     HS set     TrA Cues for form    abd isometric     Ball Squeezes 10 x 10 secs     Patellar Glides      Medial      Superior      Inferior            ROM      Passive      Active      Weight Shift      Hang Weights      Sheet Pulls      Ankle Pumps            CKC      Calf raises 3 x 10      Wall sits      Step ups      1 leg stand      Squatting      CC TKE      biodex Balance L  35, 33%   Monster Walks      Bridging      Triple threats      Stool Scoots      PRE      Extension 3 x 10 70 lbs SL  RANGE:   Flexion 3 x 10 40 lbs SL Clicking in calf with full range RANGE:         Cable Column            Leg Press 3 x 10 80 lbs SL  RANGE:         Bike X 10 mins upright ROM     Treadmill      Gait training Educated on full WBIng - tendency to circumduct R LE so cued to increase knee and hip flex and avoid slight trendelenburg. crutch Cues to increase arm swing B      Patient education: Pt was educated on PT diagnosis, prognosis, and plan of care. Pt was educated on the use of ice throughout the day. Pt was educated on signs/symptoms of infection and DVT and to call with any questions or concerns. Pt educated on post-op dressings, DONA hose, and use of crutches/AD. Therapeutic Exercise and NMR EXR  [x] (92765) Provided verbal/tactile cueing for activities related to strengthening, flexibility, endurance, ROM for improvements in LE, proximal hip, and core control with self care, mobility, lifting, ambulation.  [] (79314) Provided verbal/tactile cueing for activities related to improving balance, coordination, kinesthetic sense, posture, motor skill, proprioception  to assist with LE, proximal hip, and core control in self care, mobility, lifting, ambulation and eccentric single leg control.      NMR and Therapeutic Activities:    [x] (61824 or 30117) Provided verbal/tactile cueing for activities related to improving balance, coordination, kinesthetic sense, posture, motor skill, proprioception and motor activation to allow for proper function of core, proximal hip and LE with self care and ADLs  [] (28562) Gait Re-education- Provided training and instruction to the patient for proper LE, core and proximal hip recruitment and positioning and eccentric body weight control with ambulation re-education including up and down stairs     Home Exercise Program:  Pt was instructed in, and safely and correctly demonstrated home exercise program. Patient verbalized understanding of proper frequency of exercises. Copy of exercises was scanned into patient chart and can be found in the media file. Updated as above on 2/6/18  [x] (36516) Reviewed/Progressed HEP activities related to strengthening, flexibility, endurance, ROM of core, proximal hip and LE for functional self-care, mobility, lifting and ambulation/stair navigation   [] (46444)Reviewed/Progressed HEP activities related to improving balance, coordination, kinesthetic sense, posture, motor skill, proprioception of core, proximal hip and LE for self care, mobility, lifting, and ambulation/stair navigation      Manual Treatments:  PROM / STM / Oscillations-Mobs:  G-I, II, III, IV (PA's, Inf., Post.)  [x] (29031) Provided manual therapy to mobilize LE, proximal hip and/or LS spine soft tissue/joints for the purpose of modulating pain, promoting relaxation,  increasing ROM, reducing/eliminating soft tissue swelling/inflammation/restriction, improving soft tissue extensibility and allowing for proper ROM for normal function with self care, mobility, lifting and ambulation.    PROM of R hip into hip flex with distraction x 30, long axis distraction x 3 mins, Hip circles CW/CCW in neutral x 30, hip circles knee bent to 90 flex CW/CCW x 30, supine abd x 30, ER/IR with knee flexed to 90 degrees x 30;  side-lying

## 2018-03-27 ENCOUNTER — HOSPITAL ENCOUNTER (OUTPATIENT)
Dept: PHYSICAL THERAPY | Age: 38
Discharge: HOME | End: 2018-03-28
Admitting: ORTHOPAEDIC SURGERY

## 2018-03-27 NOTE — FLOWSHEET NOTE
(18681 or ) Provided verbal/tactile cueing for activities related to improving balance, coordination, kinesthetic sense, posture, motor skill, proprioception and motor activation to allow for proper function of core, proximal hip and LE with self care and ADLs  [] (42573) Gait Re-education- Provided training and instruction to the patient for proper LE, core and proximal hip recruitment and positioning and eccentric body weight control with ambulation re-education including up and down stairs     Home Exercise Program:  Pt was instructed in, and safely and correctly demonstrated home exercise program. Patient verbalized understanding of proper frequency of exercises. Copy of exercises was scanned into patient chart and can be found in the media file. Updated as above on 2/6/18  [x] (15500) Reviewed/Progressed HEP activities related to strengthening, flexibility, endurance, ROM of core, proximal hip and LE for functional self-care, mobility, lifting and ambulation/stair navigation   [] (05555)Reviewed/Progressed HEP activities related to improving balance, coordination, kinesthetic sense, posture, motor skill, proprioception of core, proximal hip and LE for self care, mobility, lifting, and ambulation/stair navigation      Manual Treatments:  PROM / STM / Oscillations-Mobs:  G-I, II, III, IV (PA's, Inf., Post.)  [x] (16894) Provided manual therapy to mobilize LE, proximal hip and/or LS spine soft tissue/joints for the purpose of modulating pain, promoting relaxation,  increasing ROM, reducing/eliminating soft tissue swelling/inflammation/restriction, improving soft tissue extensibility and allowing for proper ROM for normal function with self care, mobility, lifting and ambulation.    PROM of R hip into hip flex with distraction x 30, long axis distraction x 3 mins, Hip circles CW/CCW in neutral x 30, hip circles knee bent to 90 flex CW/CCW x 30, supine abd x 30, ER/IR with knee flexed to 90 degrees x 30; slowed due to co-morbidities. [x] Plan just implemented, too soon to assess goals progression  [] Other:     ASSESSMENT:      Treatment/Activity Tolerance:  [x] Patient tolerated treatment well [] Patient limited by fatique  [] Patient limited by pain  [] Patient limited by other medical complications  [x] Other: Pt is able to walk without a limp for a short distance but his limp does present with an increase in pain after about  feet. He was educated to still take a crutch if he walks a longer distance but does not need short distances at work or at home. He was challenged by exercise progressions today.      Prognosis: [x] Good [] Fair  [] Poor    Patient Requires Follow-up: [x] Yes  [] No     PLAN: Additional 2x/week for 6 weeks; send PT notes each week;  [x] Continue per plan of care [] Alter current plan (see comments)  [] Plan of care initiated [] Hold pending MD visit [] Discharge    Electronically signed by:      Miguel Zambrano PT, DPT

## 2018-03-29 ENCOUNTER — HOSPITAL ENCOUNTER (OUTPATIENT)
Dept: PHYSICAL THERAPY | Age: 38
Discharge: HOME | End: 2018-03-30
Admitting: ORTHOPAEDIC SURGERY

## 2018-03-29 NOTE — FLOWSHEET NOTE
[address]                              Physical Therapy Daily Treatment Note  Date:  3/29/2018    Patient Name:  Wyatt Jones    :  1980  MRN: [id number]    Medical/Treatment Diagnosis Information:  · Diagnosis: M25.551 R hip pain; s/p R hip arthroplasty on 18  PROCEDURE PERFORMED:  1. Right Hip arthroscopic labral debridement of macerated labral tissue    2. Right Hip arthroscopic acetabuloplasty to resect pincer lesion   3. Right Hip arthroscopic femoral head-neck osteochondroplasty to resect cam lesion  4. Right Hip arthroscopic removal of loose body / excision of os acetabuli  5. Right Hip subspine decompression  6. Right Hip acetabular chondroplasty   7. Right Hip arthroscopic synovectomy    · Treatment Diagnosis: M25.551 R knee pain; M62.81 muscle weakness  Insurance/Certification information:  PT Insurance Information:  [facility] insurance - 12 visits through 2018; Fax notes each week  Physician Information:  Referring Practitioner: Rolo Man MD  Plan of care signed (Y/N): Y    Date of Patient follow up with Physician: 18    G-Code (if applicable):      Date G-Code Applied:  3/6/2018       Progress Note: []  Yes  [x]  No  Next due by: Visit #22       Latex Allergy:  [x]NO      []YES  Preferred Language for Healthcare:   [x]English       []other:    Visit # Insurance Allowable   4 12 additional visits 3/20/18 through  (used 13 priorly)     Pain level:  4/10 average pain On 3/29/2018     SUBJECTIVE:   Pt has been sore at work. 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 This is making him sore. Pt reports he does not take any medicine.  He reports before he had surgery he was on anti-inflammatories and muscle relaxer before surgery so his pain was different and he just pushed through pain because he had it so many Attempted supine with rope but had increased groin pain   Hip adductor No stretch felt and good range so not needed HEP    SLR      Supine      Prone hip ER/IR rotations      Prone hip extn      standing hip Abduction 3 x 10 2 lbs     Adducton      bridge 3 x 10 on SB     SLR+      clams    Mild discomfort   Stool hip ER/IR      Standing knee flex      Isometrics      Quad sets     glute set     HS set     TrA Cues for form    abd isometric     Ball Squeezes      Patellar Glides      Medial      Superior      Inferior            ROM      Passive      Active      Weight Shift      Hang Weights      Sheet Pulls      Ankle Pumps            CKC      Calf raises      Wall sits 3 x 30 secs      Step ups      1 leg stand 3 x 30 secs on airex     Squatting Mini 3 x 10  On RB     CC TKE      biodex Balance L  35, 33%   Monster Walks 4 short laps green band     Bridging      Triple threats      Stool Scoots      PRE      Extension 3 x 10 70 lbs SL  RANGE:   Flexion 3 x 10 40 lbs SL Clicking in calf with full range RANGE:         Cable Column            Leg Press 3 x 10 80 lbs SL  RANGE:         Bike X 10 mins upright ROM     Treadmill      Gait training Educated on full WBIng - tendency to circumduct R LE so cued to increase knee and hip flex and avoid slight trendelenburg. crutch Cues to increase arm swing B      Patient education: Pt was educated on PT diagnosis, prognosis, and plan of care. Pt was educated on the use of ice throughout the day. Pt was educated on signs/symptoms of infection and DVT and to call with any questions or concerns. Pt educated on post-op dressings, DONA hose, and use of crutches/AD.         Therapeutic Exercise and NMR EXR  [x] (76601) Provided verbal/tactile cueing for activities related to strengthening, flexibility, endurance, ROM for improvements in LE, proximal hip, and core control with self care, mobility, lifting, ambulation.  [] (73848) Provided verbal/tactile cueing for activities care, mobility, lifting and ambulation. PROM of R hip into hip flex with distraction x 30, long axis distraction x 3 mins, Hip circles CW/CCW in neutral x 30, hip circles knee bent to 90 flex CW/CCW x 30, supine abd x 30, ER/IR with knee flexed to 90 degrees x 30 supine and prone;  side-lying hip extn x 30. Gentle long leg distractionx 10 reps  STM to R IT band and posterior to greater trochanter. P/A mobs to hip in prone with hip extn x 30  Manual quad stretch 5 x 30 secs  Modalities:  will ice at home    Charges:  Timed Code Treatment Minutes: 62   Total Treatment Minutes: 84     [] EVAL (LOW) 12592   [] EVAL (MOD) 18393   [] EVAL (HIGH) 76033   [] RE-EVAL   [x] TG(86752) x  2   [] IONTO  [] NMR (32718) x      [] VASO  [x] Manual (14526) x  1    [] Other:  [x] TA x  1    [] Mech Traction (49270)  [] ES(attended) (87859)      [] ES (un) (12525):     GOALS: Short Term Goals: To be achieved in: 2 weeks  1. Independent in HEP and progression per patient tolerance, in order to prevent re-injury.- MET   2. Patient will have a decrease in pain to 0-2/10 to facilitate improvement in movement, function, and ADLs as indicated by Functional Deficits. - progressing     Long Term Goals: To be achieved in: 12 weeks- NOT MET, progressing  1. Functional Disability index score of 80% or more for the LEFS to assist with reaching prior level of function. 2. Patient will demonstrate increased R hip AROM to 120 flex, 10 extn, 30 IR, 40 ER to allow for proper joint functioning as indicated by patients Functional Deficits. 3. Patient will demonstrate an increase in R hip strength to 4+/5 hip abd and extn and knee flex/extn to 5/5 to allow for proper functional mobility as indicated by patients Functional Deficits. 4. Patient will return to full duty work without increased symptoms or restriction.    5. Patient will be able to lift 50 lbs from floor to waist without increased R hip pain to be able to return to full duty work and

## 2018-04-01 ENCOUNTER — HOSPITAL ENCOUNTER (OUTPATIENT)
Dept: OTHER | Age: 38
Discharge: HOME | End: 2018-04-30
Attending: ORTHOPAEDIC SURGERY | Admitting: ORTHOPAEDIC SURGERY

## 2018-04-03 ENCOUNTER — HOSPITAL ENCOUNTER (OUTPATIENT)
Dept: PHYSICAL THERAPY | Age: 38
Discharge: HOME | End: 2018-04-04
Admitting: ORTHOPAEDIC SURGERY

## 2018-04-03 NOTE — FLOWSHEET NOTE
3/13/18  Observation:   Test measurements:    Joint mobility:               []Normal               [x]Hypo              []Hyper     Palpation: n/t     Functional Mobility/Transfers: Indep     Posture: n/t     Bandages/Dressings/Incisions:     Gait: (include devices/WB status)  Pt was less antalgic with ambulation in full Wbing today but did report some pain 4/10 in anterior hip. - 3/15/18.  Pt still using single crutch due to pain with ambulation.      Single leg stance:painful     Squats: increased pain anterior hip                   ROM PROM- 3/13/18 AROM Comments     Left Right Left Right     Flexion  121 108 flex          Extension  20  16         Abduction  48  42         Adduction             ER  46  37         IR  60  26                          Strength- 3/6/18 Left Right Comments   Hip flexors  4+       Hip extension  deferred       Hip abduction  deferred       Hip adduction         Hip ER  deferred       Hip IR  deferred       Quads  5  4+     Hamstrings  5  5        Flexibility Left Right Comments   Hamstrings   poor     ITB (Obers test)         Hip flexor(Osmin test)         gastroc   poor     Rectus femoris(Elys test)                       Functional assessment on 3/6/2018  Functional Assessment Tool Used: LEFS  Score: 33.75%        RESTRICTIONS/PRECAUTIONS: per script scanned into Media- 3 weeks FFWB    Exercises/Interventions:     Exercise/Equipment Resistance Repetitions Other comments   Stretching      Hamstring 5 x 30 secs     Hip Flexor      ITB- Rope      Grion      Quad 5 x 30 secs rope and propped      Inclined Calf      Towel Pull      Piriformis      Figure 4 5 x 30 secs     SKTC      Half kneeling hip flexor stretch 5 x 30 secs     Quadruped rocking      Standing IT band stretch 3 x 30 secs  Attempted supine with rope but had increased groin pain   Hip adductor No stretch felt and good range so not needed HEP    SLR      Standing hip flex SLR  3 x 10 3 lbs     Prone hip ER/IR rotations Prone hip extn      standing hip Abduction 3 x 10 3 lbs     Adducton      bridge 3 x 10 on SB     SLR+      clams    Mild discomfort   Stool hip ER/IR      Standing knee flex      Isometrics      Quad sets     glute set     HS set     TrA Cues for form    abd isometric     Ball Squeezes      Patellar Glides      Medial      Superior      Inferior            ROM      Passive      Active      Weight Shift      Hang Weights      Sheet Pulls      Ankle Pumps            CKC      Calf raises 3 x 10 SL     Wall sits 3 x 30 secs      Step ups 2 x 10 L 3     1 leg stand      Squatting Mini 3 x 10  On RB     CC TKE      biodex Balance L  35, 33%   Monster Walks 5 short laps black band     Bridging      Triple threats      Stool Scoots 3 laps, SL      PRE      Extension 3 x 10 70 lbs SL  RANGE:   Flexion 3 x 10 40 lbs SL Clicking in calf with full range RANGE:         Cable Column            Leg Press 3 x 10 110 lbs SL  RANGE:         Bike X 10 mins upright ROM     Treadmill      Gait training Educated on full WBIng - tendency to circumduct R LE so cued to increase knee and hip flex and avoid slight trendelenburg. crutch Cues to increase arm swing B      Patient education: Pt was educated on PT diagnosis, prognosis, and plan of care. Pt was educated on the use of ice throughout the day. Pt was educated on signs/symptoms of infection and DVT and to call with any questions or concerns. Pt educated on post-op dressings, DONA hose, and use of crutches/AD.         Therapeutic Exercise and NMR EXR  [x] (69402) Provided verbal/tactile cueing for activities related to strengthening, flexibility, endurance, ROM for improvements in LE, proximal hip, and core control with self care, mobility, lifting, ambulation.  [] (97859) Provided verbal/tactile cueing for activities related to improving balance, coordination, kinesthetic sense, posture, motor skill, proprioception  to assist with LE, proximal hip, and core control in self care,

## 2018-04-05 ENCOUNTER — HOSPITAL ENCOUNTER (OUTPATIENT)
Dept: PHYSICAL THERAPY | Age: 38
Discharge: HOME | End: 2018-04-06
Admitting: ORTHOPAEDIC SURGERY

## 2018-04-05 NOTE — FLOWSHEET NOTE
n/t     Functional Mobility/Transfers: Indep     Posture: n/t     Bandages/Dressings/Incisions:     Gait: (include devices/WB status)  Pt was less antalgic with ambulation in full Wbing today but did report some pain 4/10 in anterior hip. - 3/15/18.  Pt still using single crutch due to pain with ambulation.      Single leg stance:painful     Squats: increased pain anterior hip                   ROM PROM- 3/13/18 AROM Comments     Left Right Left Right     Flexion  121 108 flex          Extension  20  16         Abduction  48  42         Adduction             ER  46  37         IR  60  26                          Strength- 3/6/18 Left Right Comments   Hip flexors  4+       Hip extension  deferred       Hip abduction  deferred       Hip adduction         Hip ER  deferred       Hip IR  deferred       Quads  5  4+     Hamstrings  5  5        Flexibility Left Right Comments   Hamstrings   poor     ITB (Obers test)         Hip flexor(Osmin test)         gastroc   poor     Rectus femoris(Elys test)                       Functional assessment on 3/6/2018  Functional Assessment Tool Used: LEFS  Score: 33.75%        RESTRICTIONS/PRECAUTIONS: per script scanned into Media- 3 weeks FFWB    Exercises/Interventions:     Exercise/Equipment Resistance Repetitions Other comments   Stretching      Hamstring      Hip Flexor 5 x 30 secs off edge of bed     ITB- Rope      Grion      Quad 5     Inclined Calf      Towel Pull      Piriformis      Figure 4 5 x 30 secs     SKTC      Half kneeling hip flexor stretch      Quadruped rocking      Standing IT band stretch   Attempted supine with rope but had increased groin pain   Hip adductor No stretch felt and good range so not needed HEP    SLR      Standing hip flex SLR  3 x 10 3 lbs     Prone hip ER/IR rotations      Prone hip extn      standing hip Abduction 3 x 10 3 lbs     Adducton      bridge      SLR+      clams    Mild discomfort   Stool hip ER/IR      Standing knee flex Isometrics      Quad sets     glute set     HS set     TrA With alt marches 3 x 10 BCues for form    abd isometric     Ball Squeezes      Patellar Glides      Medial      Superior      Inferior            ROM      Passive      Active      Weight Shift      Hang Weights      Sheet Pulls      Ankle Pumps            CKC      Calf raises 3 x 10 SL     Wall sits 3 x 30 secs      Step ups      1 leg stand 3 x 30 secs on airex     Squatting Mini 3 x 10  On RB     CC TKE      biodex Balance L  35, 33%   Monster Walks 3 long laps black band     Quadruped fire hydrants      Bridging      Triple threats      Stool Scoots 3 laps, SL      PRE      Extension 3 x 10 70 lbs SL  RANGE:   Flexion 3 x 10 40 lbs SL Clicking in calf with full range RANGE:         Cable Column            Leg Press 3 x 10 110 lbs SL  RANGE:         Bike X 10 mins upright ROM     Treadmill      Gait training Educated on full WBIng - tendency to circumduct R LE so cued to increase knee and hip flex and avoid slight trendelenburg. crutch Cues to increase arm swing B      Patient education: Pt was educated on PT diagnosis, prognosis, and plan of care. Pt was educated on the use of ice throughout the day. Pt was educated on signs/symptoms of infection and DVT and to call with any questions or concerns. Pt educated on post-op dressings, DONA hose, and use of crutches/AD. Therapeutic Exercise and NMR EXR  [x] (79785) Provided verbal/tactile cueing for activities related to strengthening, flexibility, endurance, ROM for improvements in LE, proximal hip, and core control with self care, mobility, lifting, ambulation.  [] (89110) Provided verbal/tactile cueing for activities related to improving balance, coordination, kinesthetic sense, posture, motor skill, proprioception  to assist with LE, proximal hip, and core control in self care, mobility, lifting, ambulation and eccentric single leg control.      NMR and Therapeutic Activities:    [x] (06302 or 44227) Provided verbal/tactile cueing for activities related to improving balance, coordination, kinesthetic sense, posture, motor skill, proprioception and motor activation to allow for proper function of core, proximal hip and LE with self care and ADLs  [] (70325) Gait Re-education- Provided training and instruction to the patient for proper LE, core and proximal hip recruitment and positioning and eccentric body weight control with ambulation re-education including up and down stairs     Home Exercise Program:  Pt was instructed in, and safely and correctly demonstrated home exercise program. Patient verbalized understanding of proper frequency of exercises. Copy of exercises was scanned into patient chart and can be found in the media file. Updated as above on 2/6/18  [x] (69760) Reviewed/Progressed HEP activities related to strengthening, flexibility, endurance, ROM of core, proximal hip and LE for functional self-care, mobility, lifting and ambulation/stair navigation   [] (47967)Reviewed/Progressed HEP activities related to improving balance, coordination, kinesthetic sense, posture, motor skill, proprioception of core, proximal hip and LE for self care, mobility, lifting, and ambulation/stair navigation      Manual Treatments:  PROM / STM / Oscillations-Mobs:  G-I, II, III, IV (PA's, Inf., Post.)  [x] (78576) Provided manual therapy to mobilize LE, proximal hip and/or LS spine soft tissue/joints for the purpose of modulating pain, promoting relaxation,  increasing ROM, reducing/eliminating soft tissue swelling/inflammation/restriction, improving soft tissue extensibility and allowing for proper ROM for normal function with self care, mobility, lifting and ambulation.    PROM of R hip into hip flex with distraction x 30, long axis distraction x 3 mins, Hip circles CW/CCW in neutral x 30, hip circles knee bent to 90 flex CW/CCW x 30, supine abd x 30, ER/IR with knee flexed to 90 degrees x 30 supine and prone; side-lying hip extn x 30. Gentle long leg distractionx 10 reps  \"stick\" STM to R IT band in sidelying  STM to R IT band and posterior to greater trochanter. P/A mobs to hip in prone with hip extn x 30    Modalities:  will ice at home    Charges:  Timed Code Treatment Minutes: 58   Total Treatment Minutes: 87     [] EVAL (LOW) 79131   [] EVAL (MOD) 23889   [] EVAL (HIGH) 17640   [] RE-EVAL   [x] YW(78589) x  1   [] IONTO  [x] NMR (10557) x  1   [] VASO  [x] Manual (53267) x  1    [] Other:  [x] TA x  1    [] Mech Traction (55346)  [] ES(attended) (68977)      [] ES (un) (53472):     GOALS: Short Term Goals: To be achieved in: 2 weeks  1. Independent in HEP and progression per patient tolerance, in order to prevent re-injury.- MET   2. Patient will have a decrease in pain to 0-2/10 to facilitate improvement in movement, function, and ADLs as indicated by Functional Deficits. - progressing     Long Term Goals: To be achieved in: 12 weeks- NOT MET, progressing  1. Functional Disability index score of 80% or more for the LEFS to assist with reaching prior level of function. 2. Patient will demonstrate increased R hip AROM to 120 flex, 10 extn, 30 IR, 40 ER to allow for proper joint functioning as indicated by patients Functional Deficits. 3. Patient will demonstrate an increase in R hip strength to 4+/5 hip abd and extn and knee flex/extn to 5/5 to allow for proper functional mobility as indicated by patients Functional Deficits. 4. Patient will return to full duty work without increased symptoms or restriction. 5. 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     [] Progression is slowed due to

## 2018-04-10 ENCOUNTER — HOSPITAL ENCOUNTER (OUTPATIENT)
Dept: PHYSICAL THERAPY | Age: 38
Discharge: HOME | End: 2018-04-11
Admitting: ORTHOPAEDIC SURGERY

## 2018-04-10 NOTE — FLOWSHEET NOTE
[facility] [address] [place] [address]                              Physical Therapy Daily Treatment Note  Date:  4/10/2018    Patient Name:  Keren Mario    :  1980  MRN: [id number]    Medical/Treatment Diagnosis Information:  · Diagnosis: M25.551 R hip pain; s/p R hip arthroplasty on 18  PROCEDURE PERFORMED:  1. Right Hip arthroscopic labral debridement of macerated labral tissue    2. Right Hip arthroscopic acetabuloplasty to resect pincer lesion   3. Right Hip arthroscopic femoral head-neck osteochondroplasty to resect cam lesion  4. Right Hip arthroscopic removal of loose body / excision of os acetabuli  5. Right Hip subspine decompression  6. Right Hip acetabular chondroplasty   7. Right Hip arthroscopic synovectomy    · Treatment Diagnosis: M25.551 R knee pain; M62.81 muscle weakness  Insurance/Certification information:  PT Insurance Information: [facility] [place] [facility] [place] - 12 visits through 2018; Fax notes each week  Physician Information:  Referring Practitioner: Arminda Cm MD  Plan of care signed (Y/N): Y    Date of Patient follow up with Physician: 18    G-Code (if applicable):      Date G-Code Applied:  3/6/2018       Progress Note: []  Yes  [x]  No  Next due by: Visit #22       Latex Allergy:  [x]NO      []YES  Preferred Language for Healthcare:   [x]English       []other:    Visit # Insurance Allowable   7 12 additional visits 3/20/18 through  (used 13 priorly)     Pain level:  1/10 standing at beginning of session; 10/10 when stabbing pain On 4/10/2018     SUBJECTIVE:   Pt reports that overall he is better than last week. He still gets a stabbing pain in his hip when he gets in/out of car or turns/pivots on his hip. He also has pain after sitting for a while then going to get up but goes away as he continues to move.  He has increased his weekly steps to about an average of 6,000 hip Abduction     Adducton      bridge      Standing SLR hip flex  3 x 10 blue TB     Standing SLR hip extn  3 x 10 blue TB     Standing SLR hip abd 3 x 10 blue TB     Standing SLR hip add  3x 10 blue TB     SLR+      clams    Mild discomfort   Stool hip ER/IR      Standing knee flex      Isometrics      Quad sets     glute set     HS set     TrA Cues for form    abd isometric     Ball Squeezes      Patellar Glides      Medial      Superior      Inferior            ROM      Passive      Active      Weight Shift      Hang Weights      Sheet Pulls      Ankle Pumps            CKC      Calf raises 3 x 10 SL     Wall sits 3 x 30 secs      Step ups 2 x 10 L 3 lateral      1 leg stand 3 x 30 secs on airex     Squatting      CC TKE      biodex Balance L  35, 33%   Monster Walks 3 long laps black band     Quadruped fire hydrants      Bridging      Lateral heel taps 3 x 10 L1     Triple threats      Stool Scoots 2 laps, SL      PRE      Extension 3 x 10 70 lbs SL  RANGE:   Flexion 3 x 10 40 lbs SL Clicking in calf with full range RANGE:         Cable Column            Leg Press 3 x 10 110 lbs SL  RANGE:         Bike X 10 mins upright ROM     Treadmill      Gait training Educated on full WBIng - tendency to circumduct R LE so cued to increase knee and hip flex and avoid slight trendelenburg. crutch Cues to increase arm swing B      Patient education: Pt was educated on PT diagnosis, prognosis, and plan of care. Pt was educated on the use of ice throughout the day. Pt was educated on signs/symptoms of infection and DVT and to call with any questions or concerns. Pt educated on post-op dressings, DONA hose, and use of crutches/AD.         Therapeutic Exercise and NMR EXR  [x] (26715) Provided verbal/tactile cueing for activities related to strengthening, flexibility, endurance, ROM for improvements in LE, proximal hip, and core control with self care, mobility, lifting, ambulation.  [] (69917) Provided verbal/tactile cueing for

## 2018-04-12 ENCOUNTER — HOSPITAL ENCOUNTER (OUTPATIENT)
Dept: PHYSICAL THERAPY | Age: 38
Discharge: HOME | End: 2018-04-13
Admitting: ORTHOPAEDIC SURGERY

## 2018-04-12 NOTE — FLOWSHEET NOTE
[facility] [address] [place] [address]                              Physical Therapy Daily Treatment Note  Date:  2018    Patient Name:  Vipin Oakes    :  1980  MRN: [id number]    Medical/Treatment Diagnosis Information:  · Diagnosis: M25.551 R hip pain; s/p R hip arthroplasty on 18  PROCEDURE PERFORMED:  1. Right Hip arthroscopic labral debridement of macerated labral tissue    2. Right Hip arthroscopic acetabuloplasty to resect pincer lesion   3. Right Hip arthroscopic femoral head-neck osteochondroplasty to resect cam lesion  4. Right Hip arthroscopic removal of loose body / excision of os acetabuli  5. Right Hip subspine decompression  6. Right Hip acetabular chondroplasty   7. Right Hip arthroscopic synovectomy    · Treatment Diagnosis: M25.551 R knee pain; M62.81 muscle weakness  Insurance/Certification information:  PT Insurance Information: [place] insurance - 12 visits through 2018; Fax notes each week  Physician Information:  Referring Practitioner: Alpesh Damon MD  Plan of care signed (Y/N): Y    Date of Patient follow up with Physician: 18    G-Code (if applicable):      Date G-Code Applied:  3/6/2018       Progress Note: []  Yes  [x]  No  Next due by: Visit #22       Latex Allergy:  [x]NO      []YES  Preferred Language for Healthcare:   [x]English       []other:    Visit # Insurance Allowable   8 12 additional visits 3/20/18 through  (used 13 priorly)     Pain level:  1/10 standing at beginning of session; 10/10 when stabbing pain On 2018     SUBJECTIVE:   Pt has been walking more and already walked 6,000 steps today. He reports hip feels about the same and felt a little sore after last session, but not bad.      OBJECTIVE: 3/13/18  Observation:   Test measurements:    Joint mobility:               []Normal               [x]Hypo              []Hyper     Palpation: n/t     Functional Mobility/Transfers: Indep     Posture: n/t     Bandages/Dressings/Incisions:     Gait: (include devices/WB status)  Pt was less antalgic with ambulation in full Wbing today but did report some pain 4/10 in anterior hip. - 3/15/18.  Pt still using single crutch due to pain with ambulation.      Single leg stance:painful     Squats: increased pain anterior hip                   ROM PROM- 3/13/18 AROM Comments     Left Right Left Right     Flexion  121 108 flex          Extension  20  16         Abduction  48  42         Adduction             ER  46  37         IR  60  26                          Strength- 3/6/18 Left Right Comments   Hip flexors  4+       Hip extension  deferred       Hip abduction  deferred       Hip adduction         Hip ER  deferred       Hip IR  deferred       Quads  5  4+     Hamstrings  5  5        Flexibility Left Right Comments   Hamstrings   poor     ITB (Obers test)         Hip flexor(Osmin test)         gastroc   poor     Rectus femoris(Elys test)                       Functional assessment on 3/6/2018  Functional Assessment Tool Used: LEFS  Score: 33.75%        RESTRICTIONS/PRECAUTIONS: per script scanned into Media- 3 weeks FFWB    Exercises/Interventions:     Exercise/Equipment Resistance Repetitions Other comments   Stretching      Hamstring      Hip Flexor 5 x 30 secs off edge of bed     ITB- Rope      Grion      Quad 5 x 30 secs rope and propped      Inclined Calf      Towel Pull      Piriformis      Figure 4 5 x 30 secs     SKTC 10 x 10 secs     Half kneeling hip flexor stretch      Quadruped rocking      Standing IT band stretch 3 x 30 secs  Attempted supine with rope but had increased groin pain   Hip adductor No stretch felt and good range so not needed HEP    SLR      Standing hip flex SLR      Prone hip ER/IR rotations     Prone hip extn     standing hip Abduction     Adducton      bridge      Standing SLR hip flex  3 x 10 blue TB     Standing SLR hip extn  3 x 10 blue TB     Standing SLR hip abd 3 x 10 blue TB     Standing SLR hip add  3x 10 blue TB     SLR+      clams 3 x 10 5 lbs  Mild discomfort   Stool hip ER/IR X 20 ROM     Standing knee flex      Isometrics      Quad sets     glute set     HS set     TrA Cues for form    abd isometric     Ball Squeezes      Patellar Glides      Medial      Superior      Inferior            ROM      Passive      Active      Weight Shift      Hang Weights      Sheet Pulls      Ankle Pumps            CKC      Calf raises 3 x 10 SL     Wall sits 3 x 30 secs      Step ups      1 leg stand      Squatting Mini 3 x 10  On RB     CC TKE      biodex Balance L  35, 33%   Monster Walks 3 long laps black band     Quadruped fire hydrants      Bridging      Lateral heel taps      Triple threats      Stool Scoots 3 laps, SL      PRE      Extension 3 x 10 70 lbs SL  RANGE:   Flexion 3 x 10 40 lbs SL Clicking in calf with full range RANGE:         Cable Column            Leg Press 3 x 10 110 lbs SL  RANGE:         Bike X 10 mins upright ROM     Treadmill      Gait training Educated on full WBIng - tendency to circumduct R LE so cued to increase knee and hip flex and avoid slight trendelenburg. crutch Cues to increase arm swing B      Patient education: Pt was educated on PT diagnosis, prognosis, and plan of care. Pt was educated on the use of ice throughout the day. Pt was educated on signs/symptoms of infection and DVT and to call with any questions or concerns. Pt educated on post-op dressings, DONA hose, and use of crutches/AD.         Therapeutic Exercise and NMR EXR  [x] (26369) Provided verbal/tactile cueing for activities related to strengthening, flexibility, endurance, ROM for improvements in LE, proximal hip, and core control with self care, mobility, lifting, ambulation.  [] (54390) Provided verbal/tactile cueing for activities related to improving balance, coordination, kinesthetic sense, posture, motor skill, proprioception  to assist with LE, proximal hip, and core control in self care, mobility, lifting, ambulation and eccentric single leg control. NMR and Therapeutic Activities:    [x] (18726 or 81458) Provided verbal/tactile cueing for activities related to improving balance, coordination, kinesthetic sense, posture, motor skill, proprioception and motor activation to allow for proper function of core, proximal hip and LE with self care and ADLs  [] (67066) Gait Re-education- Provided training and instruction to the patient for proper LE, core and proximal hip recruitment and positioning and eccentric body weight control with ambulation re-education including up and down stairs     Home Exercise Program:  Pt was instructed in, and safely and correctly demonstrated home exercise program. Patient verbalized understanding of proper frequency of exercises. Copy of exercises was scanned into patient chart and can be found in the media file. Updated as above on 2/6/18  [x] (23983) Reviewed/Progressed HEP activities related to strengthening, flexibility, endurance, ROM of core, proximal hip and LE for functional self-care, mobility, lifting and ambulation/stair navigation   [] (53786)Reviewed/Progressed HEP activities related to improving balance, coordination, kinesthetic sense, posture, motor skill, proprioception of core, proximal hip and LE for self care, mobility, lifting, and ambulation/stair navigation      Manual Treatments:  PROM / STM / Oscillations-Mobs:  G-I, II, III, IV (PA's, Inf., Post.)  [x] (63366) Provided manual therapy to mobilize LE, proximal hip and/or LS spine soft tissue/joints for the purpose of modulating pain, promoting relaxation,  increasing ROM, reducing/eliminating soft tissue swelling/inflammation/restriction, improving soft tissue extensibility and allowing for proper ROM for normal function with self care, mobility, lifting and ambulation.    PROM of R hip into hip flex with distraction x 30, long axis distraction x 3 mins, 0, hip circles knee bent to 90 flex CW/CCW x 30, supine abd x 30, ER/IR with knee flexed to 90 degrees x 30 supine and prone;   Gentle long leg distractionx 10 reps  \"      P/A mobs to hip in prone with hip extn x 30    Modalities:  will ice at home    Charges:  Timed Code Treatment Minutes: 59   Total Treatment Minutes: 72     [] EVAL (LOW) 93366   [] EVAL (MOD) 27555   [] EVAL (HIGH) 13225   [] RE-EVAL   [x] PV(96014) x  1   [] IONTO  [x] NMR (91071) x  1   [] VASO  [x] Manual (61361) x  1    [] Other:  [x] TA x  1    [] Mech Traction (12273)  [] ES(attended) (06562)      [] ES (un) (12523):     GOALS: Short Term Goals: To be achieved in: 2 weeks  1. Independent in HEP and progression per patient tolerance, in order to prevent re-injury.- MET   2. Patient will have a decrease in pain to 0-2/10 to facilitate improvement in movement, function, and ADLs as indicated by Functional Deficits. - progressing     Long Term Goals: To be achieved in: 12 weeks- NOT MET, progressing  1. Functional Disability index score of 80% or more for the LEFS to assist with reaching prior level of function. 2. Patient will demonstrate increased R hip AROM to 120 flex, 10 extn, 30 IR, 40 ER to allow for proper joint functioning as indicated by patients Functional Deficits. 3. Patient will demonstrate an increase in R hip strength to 4+/5 hip abd and extn and knee flex/extn to 5/5 to allow for proper functional mobility as indicated by patients Functional Deficits. 4. Patient will return to full duty work without increased symptoms or restriction. 5. 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     [] Progression is slowed due to complexities listed. [] Progression has been slowed due to co-morbidities. [x] Plan just implemented, too soon to assess goals progression  [] Other:     ASSESSMENT:      Treatment/Activity Tolerance:  [x] Patient tolerated treatment well [] Patient limited by fatique  [] Patient limited by pain  [] Patient limited by other medical complications  [x] Other: Pt has decreased pain into hip flex with inferior glide during motion. He continues to have most pain in passive ER/IR motions. He did well with the strengthening exercises today and will continue to be progressed as tolerated to be able to return to PLOF. Prognosis: [x] Good [] Fair  [] Poor    Patient Requires Follow-up: [x] Yes  [] No     PLAN: Additional 2x/week for 6 weeks; send PT notes each week; review exercises and progress strengthening.    [x] Continue per plan of care [] Alter current plan (see comments)  [] Plan of care initiated [] Hold pending MD visit [] Discharge    Electronically signed by:      Candace Feliz PT, DPT

## 2018-04-17 ENCOUNTER — HOSPITAL ENCOUNTER (OUTPATIENT)
Dept: PHYSICAL THERAPY | Age: 38
Discharge: HOME | End: 2018-04-18
Admitting: ORTHOPAEDIC SURGERY

## 2018-04-17 NOTE — FLOWSHEET NOTE
[address]                              Physical Therapy Daily Treatment Note  Date:  2018    Patient Name:  Radha Pak    :  1980  MRN: [id number]    Medical/Treatment Diagnosis Information:  · Diagnosis: M25.551 R hip pain; s/p R hip arthroplasty on 18  PROCEDURE PERFORMED:  1. Right Hip arthroscopic labral debridement of macerated labral tissue    2. Right Hip arthroscopic acetabuloplasty to resect pincer lesion   3. Right Hip arthroscopic femoral head-neck osteochondroplasty to resect cam lesion  4. Right Hip arthroscopic removal of loose body / excision of os acetabuli  5. Right Hip subspine decompression  6. Right Hip acetabular chondroplasty   7. Right Hip arthroscopic synovectomy    · Treatment Diagnosis: M25.551 R knee pain; M62.81 muscle weakness  Insurance/Certification information:  PT Insurance Information:  [facility] insurance - 12 visits through 2018; Fax notes each week  Physician Information:  Referring Practitioner: Tatiana Rodríguez MD  Plan of care signed (Y/N): Y    Date of Patient follow up with Physician: 18    G-Code (if applicable):      Date G-Code Applied:  3/6/2018        Progress Note: []  Yes  [x]  No  Next due by: Visit #22       Latex Allergy:  [x]NO      []YES  Preferred Language for Healthcare:   [x]English       []other:    Visit # Insurance Allowable   9 12 additional visits 3/20/18 through  (used 13 priorly)     Pain level:  1/10 standing at beginning of session; 10/10 when stabbing pain On 2018     SUBJECTIVE:   Pt reports that he is more sore today and not sure why. He did slip on  getting out of truck and was more sore yesterday. He was sore with doing his exercises yesterday also. He has been consistent with these at home.      OBJECTIVE: 3/13/18  Observation:   Test measurements:    Joint mobility: Standing SLR hip flex      Standing SLR hip extn      Standing SLR hip abd     Standing SLR hip add      SLR+      clams 3 x 10 5 lbs  Mild discomfort   Stool hip ER/IR            Isometrics      Quad sets     glute set     HS set     TrA Feet held in air with alt heel taps 3 x 10 BCues for form    abd isometric     Ball Squeezes      Patellar Glides      Medial      Superior      Inferior            ROM      Passive      Active      Weight Shift      Hang Weights      Sheet Pulls      Ankle Pumps            CKC      Calf raises 3 x 10 SL     circuit Repeat x 3 with black band:  Wall sit x 30 secs   Lateral      Step ups      1 leg stand      Squatting Mini 3 x 10  On RB     CC TKE      biodex Balance L  35, 33%   Monster Walks 3 long laps black band     Quadruped fire hydrants 3 x 10     Bridging      Lateral heel taps 3 x 10 L1     Triple threats      Stool Scoots 3 laps, SL hard stool     PRE      Extension 3 x 10 70 lbs SL  RANGE:   Flexion 3 x 10 40 lbs SL Clicking in calf with full range RANGE:         Cable Column            Leg Press 3 x 10 110 lbs SL  RANGE:         Bike X 10 mins upright ROM     Treadmill      Gait training Educated on full WBIng - tendency to circumduct R LE so cued to increase knee and hip flex and avoid slight trendelenburg. crutch Cues to increase arm swing B      Patient education: Pt was educated on PT diagnosis, prognosis, and plan of care. Pt was educated on the use of ice throughout the day. Pt was educated on signs/symptoms of infection and DVT and to call with any questions or concerns. Pt educated on post-op dressings, DONA hose, and use of crutches/AD.         Therapeutic Exercise and NMR EXR  [x] (66664) Provided verbal/tactile cueing for activities related to strengthening, flexibility, endurance, ROM for improvements in LE, proximal hip, and core control with self care, mobility, lifting, ambulation.  [] (89844) Provided verbal/tactile cueing for activities related to mobility, lifting and ambulation. PROM of R hip into hip flex with distraction x 30, long axis distraction x 3 mins, 0,  ER/IR with knee flexed to 90 degrees x 30    Gentle long leg distractionx 10 reps  \"stick\" STM to R IT band and posterior in sidelying      P/A mobs to hip in prone with hip extn x 30  Manual quad stretch on prop 5 x 30 secs  Modalities:  will ice at home    Charges:  Timed Code Treatment Minutes: 63   Total Treatment Minutes: 82     [] EVAL (LOW) 98879   [] EVAL (MOD) 34798   [] EVAL (HIGH) 98247   [] RE-EVAL   [x] XF(19722) x  1   [] IONTO  [x] NMR (21201) x  1   [] VASO  [x] Manual (11883) x  1    [] Other:  [x] TA x  1    [] Mech Traction (23543)  [] ES(attended) (14780)      [] ES (un) (02376):     GOALS: Short Term Goals: To be achieved in: 2 weeks  1. Independent in HEP and progression per patient tolerance, in order to prevent re-injury.- MET   2. Patient will have a decrease in pain to 0-2/10 to facilitate improvement in movement, function, and ADLs as indicated by Functional Deficits. - progressing     Long Term Goals: To be achieved in: 12 weeks- NOT MET, progressing  1. Functional Disability index score of 80% or more for the LEFS to assist with reaching prior level of function. 2. Patient will demonstrate increased R hip AROM to 120 flex, 10 extn, 30 IR, 40 ER to allow for proper joint functioning as indicated by patients Functional Deficits. 3. Patient will demonstrate an increase in R hip strength to 4+/5 hip abd and extn and knee flex/extn to 5/5 to allow for proper functional mobility as indicated by patients Functional Deficits. 4. Patient will return to full duty work without increased symptoms or restriction. 5. 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      6. Patient will return to light jogging to return to playing sports with kids (cut and paste from eval)    Progression

## 2018-04-19 ENCOUNTER — HOSPITAL ENCOUNTER (OUTPATIENT)
Dept: PHYSICAL THERAPY | Age: 38
Discharge: HOME | End: 2018-04-20
Admitting: ORTHOPAEDIC SURGERY

## 2018-04-19 NOTE — PLAN OF CARE
2018    Patient Name:  Tony Ambriz    :  1980  MRN: [id number]    Medical/Treatment Diagnosis Information:  · Diagnosis: M25.551 R hip pain; s/p R hip arthroplasty on 18  PROCEDURE PERFORMED:  1. Right Hip arthroscopic labral debridement of macerated labral tissue    2. Right Hip arthroscopic acetabuloplasty to resect pincer lesion   3. Right Hip arthroscopic femoral head-neck osteochondroplasty to resect cam lesion  4. Right Hip arthroscopic removal of loose body / excision of os acetabuli  5. Right Hip subspine decompression  6. Right Hip acetabular chondroplasty   7. Right Hip arthroscopic synovectomy    · Treatment Diagnosis: M25.551 R knee pain; M62.81 muscle weakness  Insurance/Certification information:  PT Insurance Information: [place] insurance - 12 visits through 2018; Fax notes each week  Physician Information:  Referring Practitioner: Hiwot Walker MD  Plan of care signed (Y/N): Y    Date of Patient follow up with Physician: 18    G-Code (if applicable):      Date G-Code Applied:          Progress Note: []  Yes  [x]  No  Next due by: Visit #33       Latex Allergy:  [x]NO      []YES  Preferred Language for Healthcare:   [x]English       []other:    Visit # Insurance Allowable   10 12 additional visits 3/20/18 through  (used 13 priorly)     Pain level:  1/10 at rest; 10/10 when stabbing pain On 2018     SUBJECTIVE:   Pt was pretty sore after last session. His biggest complaint is pain getting up after sitting for first 10-20 steps then it goes away. He is anxious to start running though.      OBJECTIVE: 18  Observation:   Test measurements:    Joint mobility:               []Normal               [x]Hypo              []Hyper     Palpation: n/t     Functional Mobility/Transfers: Indep     Posture: n/t     Bandages/Dressings/Incisions:     Gait: (include devices/WB status)  Pt very mild lateral heel whip on R   Single leg stance:painful     Squats: proprioception and motor activation to allow for proper function of core, proximal hip and LE with self care and ADLs  [] (35265) Gait Re-education- Provided training and instruction to the patient for proper LE, core and proximal hip recruitment and positioning and eccentric body weight control with ambulation re-education including up and down stairs     Home Exercise Program:  Pt was instructed in, and safely and correctly demonstrated home exercise program. Patient verbalized understanding of proper frequency of exercises. Copy of exercises was scanned into patient chart and can be found in the media file. Updated as above on 2/6/18  [x] (57633) Reviewed/Progressed HEP activities related to strengthening, flexibility, endurance, ROM of core, proximal hip and LE for functional self-care, mobility, lifting and ambulation/stair navigation   [] (75922)Reviewed/Progressed HEP activities related to improving balance, coordination, kinesthetic sense, posture, motor skill, proprioception of core, proximal hip and LE for self care, mobility, lifting, and ambulation/stair navigation      Manual Treatments:  PROM / STM / Oscillations-Mobs:  G-I, II, III, IV (PA's, Inf., Post.)  [x] (20120) Provided manual therapy to mobilize LE, proximal hip and/or LS spine soft tissue/joints for the purpose of modulating pain, promoting relaxation,  increasing ROM, reducing/eliminating soft tissue swelling/inflammation/restriction, improving soft tissue extensibility and allowing for proper ROM for normal function with self care, mobility, lifting and ambulation.    PROM of R hip into hip flex with distraction x 30, long axis distraction x 3 mins, 0,  ER/IR with knee flexed to 90 degrees x 30    Gentle long leg distractionx 10 reps  \"stick\" STM to R IT band and posterior in sidelying      P/A mobs to hip in prone with hip extn x 30  Manual quad stretch on prop 5 x 30 secs  Modalities:  will ice at home    Charges:  Timed Code Treatment

## 2018-04-24 ENCOUNTER — OFFICE VISIT (OUTPATIENT)
Dept: ORTHOPEDIC SURGERY | Age: 38
End: 2018-04-24

## 2018-04-24 ENCOUNTER — HOSPITAL ENCOUNTER (OUTPATIENT)
Dept: PHYSICAL THERAPY | Age: 38
Discharge: HOME | End: 2018-04-25
Admitting: ORTHOPAEDIC SURGERY

## 2018-04-24 VITALS
DIASTOLIC BLOOD PRESSURE: 82 MMHG | SYSTOLIC BLOOD PRESSURE: 136 MMHG | BODY MASS INDEX: 24 KG/M2 | WEIGHT: 162.04 LBS | HEART RATE: 84 BPM | HEIGHT: 69 IN

## 2018-04-24 DIAGNOSIS — Z98.890 STATUS POST HIP SURGERY: Primary | ICD-10-CM

## 2018-04-24 PROCEDURE — 99024 POSTOP FOLLOW-UP VISIT: CPT | Performed by: ORTHOPAEDIC SURGERY

## 2018-04-24 NOTE — FLOWSHEET NOTE
stabbing pain On 4/24/2018     SUBJECTIVE:  Pt saw MD and said to continue with PT 2x week or 1x/week and join a gym. He was educated that he is where he needs to be and will continue to work in therapy. He did have some arthritis at time of surgery so may be while still has limitation in ROM but overall is where he needs to be.      OBJECTIVE: 4/19/18  Observation:   Test measurements:    Joint mobility:               []Normal               [x]Hypo              []Hyper     Palpation: n/t     Functional Mobility/Transfers: Indep     Posture: n/t     Bandages/Dressings/Incisions:     Gait: (include devices/WB status)  Pt very mild lateral heel whip on R   Single leg stance:painful     Squats: increased pain anterior hip                   ROM PROM- 4/19/18 AROM Comments     Left Right Left Right     Flexion  121 112         Extension  20 19         Abduction  48  40         Adduction             ER  46  36         IR  60  36                          Strength-4/19/18 Left Right Comments   Hip flexors  4+  4     Hip extension 5  4+     Hip abduction 5 4+      Hip adduction        Hip ER 5  4+  mild pain medial thigh   Hip IR 5  4+  mild pain lateral thigh   Quads  5  5     Hamstrings  5  5        Flexibility- 4/19/18 Left Right Comments   Hamstrings   Poor+     ITB (Obers test)         Hip flexor(Osmin test)         gastroc   Fair -      Rectus femoris(Elys test)                       Functional assessment on 4/24/18  Functional Assessment Tool Used: LEFS  Score: 48.75%        RESTRICTIONS/PRECAUTIONS: per script scanned into Media- 3 weeks FFWB    Exercises/Interventions:     Exercise/Equipment Resistance Repetitions Other comments   Stretching      Hamstring 5 x 30 secs     Hip Flexor 5 x 30 secs off edge of bed     ITB- Rope      Grion      Quad      Inclined Calf      Towel Pull      Piriformis      Figure 4 3 x 30 secs     SKTC      Half kneeling hip flexor stretch      Quadruped rocking      Standing IT band stretch   Attempted supine with rope but had increased groin pain   Hip adductor No stretch felt and good range so not needed HEP    SLR      Standing hip flex SLR      Prone hip ER/IR rotations     Prone hip extn     standing hip Abduction     Adducton      HS curl on SB 3 x 10 on SB     Standing SLR hip flex  3 x 10   CC L 4   Standing SLR hip extn  3 x 10  CC L 4   Standing SLR hip abd 3 x 10  CC L 4   Standing SLR hip add  3x 10   CC L 4   SLR+      clams 3 x 10 5 lbs  Mild discomfort   Stool hip ER/IR            Isometrics      Quad sets     glute set     HS set     TrA Cues for form    abd isometric     Ball Squeezes      Patellar Glides      Medial      Superior      Inferior            ROM      Passive      Active      Weight Shift      Hang Weights      Sheet Pulls      Ankle Pumps            CKC      Calf raises 3 x 10 SL     circuit Repeat x 3 with black band:  Wall sit x 30 secs   Monster walks fwd/bwd x 1 lap     Step ups      1 leg stand      Squatting Mini 3 x 10 Boidex balance machine L 6     CC TKE      biodex Balance L  35, 33%   Monster Walks      Quadruped fire hydrants      Quadruped donkey kicks      Quadruped hip circles X 30 CW/CCW     Bridging      Lateral heel taps 3 x 10 L1     Triple threats      Stool Scoots      PRE      Extension 3 x 10 70 lbs SL  RANGE:   Flexion 3 x 10 40 lbs SL Clicking in calf with full range RANGE:         Cable Column            Leg Press 3 x 10 110 lbs SL  RANGE:   Forward plank X 30 secs, x 1 min     Bike X 10 mins upright ROM     Treadmill      Gait training Educated on full WBIng - tendency to circumduct R LE so cued to increase knee and hip flex and avoid slight trendelenburg. crutch Cues to increase arm swing B      Patient education: Pt was educated on PT diagnosis, prognosis, and plan of care. Pt was educated on the use of ice throughout the day. Pt was educated on signs/symptoms of infection and DVT and to call with any questions or concerns.  Pt G-I, II, III, IV (PA's, Inf., Post.)  [x] (38470) Provided manual therapy to mobilize LE, proximal hip and/or LS spine soft tissue/joints for the purpose of modulating pain, promoting relaxation,  increasing ROM, reducing/eliminating soft tissue swelling/inflammation/restriction, improving soft tissue extensibility and allowing for proper ROM for normal function with self care, mobility, lifting and ambulation. PROM of R hip into hip flex with distraction x 30, long axis distraction x 3 mins, 0,  ER/IR with knee flexed to 90 degrees x 30    Gentle long leg distractionx 10 reps  \"stick\" STM to R IT band and posterior in sidelying      P/A mobs to hip in prone with hip extn x 30  Manual quad stretch on prop 5 x 30 secs  Modalities:  will ice at home    Charges:  Timed Code Treatment Minutes: 56   Total Treatment Minutes: 74     [] EVAL (LOW) 68946   [] EVAL (MOD) 84787   [] EVAL (HIGH) 89533   [] RE-EVAL   [x] TM(16510) x  1   [] IONTO  [x] NMR (12139) x  1   [] VASO  [x] Manual (55540) x  1    [] Other:  [x] TA x  1    [] Mech Traction (65837)  [] ES(attended) (07526)      [] ES (un) (50185):     GOALS: Short Term Goals: To be achieved in: 2 weeks  1. Independent in HEP and progression per patient tolerance, in order to prevent re-injury.- MET   2. Patient will have a decrease in pain to 0-2/10 to facilitate improvement in movement, function, and ADLs as indicated by Functional Deficits. - MET     Long Term Goals: To be achieved in: 12 weeks- NOT MET, progressing  1. Functional Disability index score of 80% or more for the LEFS to assist with reaching prior level of function. 2. Patient will demonstrate increased R hip AROM to 120 flex, 10 extn, 30 IR, 40 ER to allow for proper joint functioning as indicated by patients Functional Deficits.    3. Patient will demonstrate an increase in R hip strength to 4+/5 hip abd and extn and knee flex/extn to 5/5 to allow for proper functional mobility as indicated by patients

## 2018-04-25 ENCOUNTER — TELEPHONE (OUTPATIENT)
Dept: ORTHOPEDIC SURGERY | Age: 38
End: 2018-04-25

## 2018-04-26 ENCOUNTER — HOSPITAL ENCOUNTER (OUTPATIENT)
Dept: PHYSICAL THERAPY | Age: 38
Discharge: HOME | End: 2018-04-27
Admitting: ORTHOPAEDIC SURGERY

## 2018-04-26 NOTE — FLOWSHEET NOTE
Isometrics      Quad sets     glute set     HS set     TrA Cues for form    abd isometric     Ball Squeezes      Patellar Glides      Medial      Superior      Inferior            ROM      Passive      Active      Weight Shift      Hang Weights      Sheet Pulls      Ankle Pumps            CKC      Calf raises      circuit Repeat x 3 with tan band:  Wall sit x 30 secs   Monster walks fwd/bwd x 1 lap  Stool scoots x 1 lap SL     Step ups      1 leg stand      Squatting      CC TKE      biodex Balance L  35, 33%   Monster Walks      Quadruped fire hydrants      Quadruped donkey kicks      Quadruped hip circles X 30 CW/CCW     Bridging      Lateral heel taps      Triple threats      Stool Scoots      PRE      Extension 3 x 10 70 lbs SL  RANGE:   Flexion 3 x 10 40 lbs SL Clicking in calf with full range RANGE:         Cable Column            Leg Press 3 x 10 130 lbs SL  RANGE:   Side plank 2 x 1 min on R side     Forward plank 2 x 1 min forward     Bike X 10 mins upright ROM     Treadmill      Gait training Educated on full WBIng - tendency to circumduct R LE so cued to increase knee and hip flex and avoid slight trendelenburg. crutch Cues to increase arm swing B      Patient education: Pt was educated on PT diagnosis, prognosis, and plan of care. Pt was educated on the use of ice throughout the day. Pt was educated on signs/symptoms of infection and DVT and to call with any questions or concerns. Pt educated on post-op dressings, DONA hose, and use of crutches/AD.         Therapeutic Exercise and NMR EXR  [x] (94010) Provided verbal/tactile cueing for activities related to strengthening, flexibility, endurance, ROM for improvements in LE, proximal hip, and core control with self care, mobility, lifting, ambulation.  [] (11385) Provided verbal/tactile cueing for activities related to improving balance, coordination, kinesthetic sense, posture, motor skill, proprioception  to assist with LE, proximal hip, and core control in self care, mobility, lifting, ambulation and eccentric single leg control. NMR and Therapeutic Activities:    [x] (57590 or 43193) Provided verbal/tactile cueing for activities related to improving balance, coordination, kinesthetic sense, posture, motor skill, proprioception and motor activation to allow for proper function of core, proximal hip and LE with self care and ADLs  [] (92081) Gait Re-education- Provided training and instruction to the patient for proper LE, core and proximal hip recruitment and positioning and eccentric body weight control with ambulation re-education including up and down stairs     Home Exercise Program:  Pt was instructed in, and safely and correctly demonstrated home exercise program. Patient verbalized understanding of proper frequency of exercises. Copy of exercises was scanned into patient chart and can be found in the media file. Updated on 4/26/18 and discussed stretching every day and strengthening exercises he may rotate to get in 3x/week as fits his schedule.    [x] (07069) Reviewed/Progressed HEP activities related to strengthening, flexibility, endurance, ROM of core, proximal hip and LE for functional self-care, mobility, lifting and ambulation/stair navigation   [] (94032)Reviewed/Progressed HEP activities related to improving balance, coordination, kinesthetic sense, posture, motor skill, proprioception of core, proximal hip and LE for self care, mobility, lifting, and ambulation/stair navigation      Manual Treatments:  PROM / STM / Oscillations-Mobs:  G-I, II, III, IV (PA's, Inf., Post.)  [x] (69345) Provided manual therapy to mobilize LE, proximal hip and/or LS spine soft tissue/joints for the purpose of modulating pain, promoting relaxation,  increasing ROM, reducing/eliminating soft tissue swelling/inflammation/restriction, improving soft tissue extensibility and allowing for proper ROM for normal function with self care, mobility, lifting and ambulation. PROM of R hip into hip flex with distraction x 30, long axis distraction x 3 mins, 0,  ER/IR with knee flexed to 90 degrees x 30    Gentle long leg distractionx 10 reps    Modalities:  will ice at home    Charges:  Timed Code Treatment Minutes: 57   Total Treatment Minutes: 90     [] EVAL (LOW) 63364   [] EVAL (MOD) 38983   [] EVAL (HIGH) 45489   [] RE-EVAL   [x] LF(87268) x  1   [] IONTO  [x] NMR (94399) x  1   [] VASO  [x] Manual (87736) x  1    [] Other:  [x] TA x  1    [] Mech Traction (99525)  [] ES(attended) (62808)      [] ES (un) (80965):     GOALS: Short Term Goals: To be achieved in: 2 weeks  1. Independent in HEP and progression per patient tolerance, in order to prevent re-injury.- MET   2. Patient will have a decrease in pain to 0-2/10 to facilitate improvement in movement, function, and ADLs as indicated by Functional Deficits. - MET     Long Term Goals: To be achieved in: 12 weeks- NOT MET, progressing  1. Functional Disability index score of 80% or more for the LEFS to assist with reaching prior level of function. 2. Patient will demonstrate increased R hip AROM to 120 flex, 10 extn, 30 IR, 40 ER to allow for proper joint functioning as indicated by patients Functional Deficits. 3. Patient will demonstrate an increase in R hip strength to 4+/5 hip abd and extn and knee flex/extn to 5/5 to allow for proper functional mobility as indicated by patients Functional Deficits. 4. Patient will return to full duty work without increased symptoms or restriction. 5. 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 [] Progression is slowed due to complexities listed.   [] Progression has been slowed due to co-morbidities. [x] Plan just implemented, too soon to assess goals progression  [] Other:     ASSESSMENT:      Treatment/Activity Tolerance:  [x] Patient tolerated treatment well [] Patient limited by fatique  [] Patient limited by pain  [] Patient limited by other medical complications  [x] Other: Pt has been improving in therapy overall. However he continues to be limited in hip flex, ER, and IR motions. He continues to have decreased strength throughout his R hip although he has been doing well progressing with strengthening exercises in therapy. He does continue to get pain in his hip with transfers and twisting on his hip and has not yet been progressed to jogging. Pt does require continued skilled PT to progress ROM towards full, progress strengthening, and return pt to jogging and PLOF. He did well in session again today and was able to add in a couple new strengthening exercises. He had some pain in groin with a couple exercises.      Prognosis: [x] Good [] Fair  [] Poor    Patient Requires Follow-up: [x] Yes  [] No     PLAN: Additional 2x/week for 8 weeks; send PT notes each week; get auth for more visits; discuss gym options   [x] Continue per plan of care [] Alter current plan (see comments)  [] Plan of care initiated [] Hold pending MD visit [] Discharge    Electronically signed by:      Olayinka Resendez PT, DPT

## 2018-05-01 ENCOUNTER — HOSPITAL ENCOUNTER (OUTPATIENT)
Dept: OTHER | Age: 38
Discharge: HOME | End: 2018-05-31
Attending: ORTHOPAEDIC SURGERY | Admitting: ORTHOPAEDIC SURGERY

## 2018-05-01 ENCOUNTER — HOSPITAL ENCOUNTER (OUTPATIENT)
Dept: PHYSICAL THERAPY | Age: 38
Discharge: HOME | End: 2018-05-02
Admitting: ORTHOPAEDIC SURGERY

## 2018-05-01 NOTE — FLOWSHEET NOTE
[facility] [address] [place] [address]                        Physical Therapy Daily Treatment Note  Date:  2018    Patient Name:  Army Kelly    :  1980  MRN: [id number]    Medical/Treatment Diagnosis Information:  · Diagnosis: M25.551 R hip pain; s/p R hip arthroplasty on 18  PROCEDURE PERFORMED:  1. Right Hip arthroscopic labral debridement of macerated labral tissue    2. Right Hip arthroscopic acetabuloplasty to resect pincer lesion   3. Right Hip arthroscopic femoral head-neck osteochondroplasty to resect cam lesion  4. Right Hip arthroscopic removal of loose body / excision of os acetabuli  5. Right Hip subspine decompression  6. Right Hip acetabular chondroplasty   7. Right Hip arthroscopic synovectomy    · Treatment Diagnosis: M25.551 R knee pain; M62.81 muscle weakness  Insurance/Certification information:  PT Insurance Information: [facility] insurance - 16 additional visits auth through ; Fax notes each week  Physician Information:  Referring Practitioner: Wiley Hamman, MD  Plan of care signed (Y/N): Y    Date of Patient follow up with Physician: 18    G-Code (if applicable):      Date G-Code Applied:  18        Progress Note: []  Yes  [x]  No  Next due by: Visit #33       Latex Allergy:  [x]NO      []YES  Preferred Language for Healthcare:   [x]English       []other:    Visit # Insurance Allowable   1 16 additional visits auth through  (used 23 priorly)     Pain level:  1/10 at rest; 10/10 when stabbing pain On 2018     SUBJECTIVE:   Pt looked into some gyms and they cost more than he wanted to pay around him.      OBJECTIVE: 18  Observation:   Test measurements:    Joint mobility:               []Normal               [x]Hypo              []Hyper     Palpation: n/t     Functional Mobility/Transfers: Indep     Posture: n/t     Bandages/Dressings/Incisions:     Gait: (include devices/WB status)  Pt very mild lateral heel whip on R   Single leg stance:painful     Squats: increased pain anterior hip                   ROM PROM- 4/19/18 AROM Comments     Left Right Left Right     Flexion  121 112         Extension  20 19         Abduction  48  40         Adduction             ER  46  36         IR  60  36                          Strength-4/19/18 Left Right Comments   Hip flexors  4+  4     Hip extension 5  4+     Hip abduction 5 4+      Hip adduction        Hip ER 5  4+  mild pain medial thigh   Hip IR 5  4+  mild pain lateral thigh   Quads  5  5     Hamstrings  5  5        Flexibility- 4/19/18 Left Right Comments   Hamstrings   Poor+     ITB (Obers test)         Hip flexor(Osmin test)         gastroc   Fair -      Rectus femoris(Elys test)                       Functional assessment on 4/24/18  Functional Assessment Tool Used: LEFS  Score: 48.75%        RESTRICTIONS/PRECAUTIONS: per script scanned into Media- 3 weeks FFWB    Exercises/Interventions:     Exercise/Equipment Resistance Repetitions Other comments   Stretching      Hamstring     Hip Flexor     ITB- Rope     Grion     Quad     Inclined Calf     Towel Pull     Piriformis     Figure 4     SKTC      Half kneeling hip flexor stretch      Quadruped rocking      Standing IT band stretch   Attempted supine with rope but had increased groin pain   Hip adductor No stretch felt and good range so not needed HEP    SLR      Standing hip flex SLR      Prone hip ER/IR rotations     Prone hip extn     standing hip Abduction     Adducton      HS curl on SB      Standing SLR hip flex  3 x 10   CC L 4   Standing SLR hip extn  3 x 10  CC L 4   Standing SLR hip abd 3 x 10  CC L 4   Standing SLR hip add  3x 10   CC L 4   SLR+      clams  Mild discomfort   Stool hip ER/IR     Single leg bridge     Isometrics      Quad sets     glute set     HS set     TrA Cues for form    abd isometric listed. [] Progression is slowed due to complexities listed. [] Progression has been slowed due to co-morbidities. [x] Plan just implemented, too soon to assess goals progression  [] Other:     ASSESSMENT:      Treatment/Activity Tolerance:  [x] Patient tolerated treatment well [] Patient limited by fatique  [] Patient limited by pain  [] Patient limited by other medical complications  [x] Other: Initiated running on [employer] today at 75-85% BW. He had good form with this with only occasional hip soreness/tightness. He felt fatigued cardio-vascularly. He will benefit from PT 2x/week for next couple weeks to try to progress him to 100% BW jogging. He tolerated remaining therapy well today.      Prognosis: [x] Good [] Fair  [] Poor    Patient Requires Follow-up: [x] Yes  [] No     PLAN: Additional 2x/week for 8 weeks; send PT notes each week;  discuss gym/healthplex options; progress BW running and TRX exercises  [x] Continue per plan of care [] Alter current plan (see comments)  [] Plan of care initiated [] Hold pending MD visit [] Discharge    Electronically signed by:      Krista Blanco PT, DPT

## 2018-05-03 ENCOUNTER — HOSPITAL ENCOUNTER (OUTPATIENT)
Dept: PHYSICAL THERAPY | Age: 38
Discharge: HOME | End: 2018-05-04
Admitting: ORTHOPAEDIC SURGERY

## 2018-05-03 NOTE — FLOWSHEET NOTE
[facility]                        Physical Therapy Daily Treatment Note  Date:  5/3/2018    Patient Name:  Destiny Butcher    :  1980  MRN: [id number]    Medical/Treatment Diagnosis Information:  · Diagnosis: M25.551 R hip pain; s/p R hip arthroplasty on 18  PROCEDURE PERFORMED:  1. Right Hip arthroscopic labral debridement of macerated labral tissue    2. Right Hip arthroscopic acetabuloplasty to resect pincer lesion   3. Right Hip arthroscopic femoral head-neck osteochondroplasty to resect cam lesion  4. Right Hip arthroscopic removal of loose body / excision of os acetabuli  5. Right Hip subspine decompression  6. Right Hip acetabular chondroplasty   7. Right Hip arthroscopic synovectomy    · Treatment Diagnosis: M25.551 R knee pain; M62.81 muscle weakness  Insurance/Certification information:  PT Insurance Information: [place] insurance - 16 additional visits auth through ; Fax notes each week  Physician Information:  Referring Practitioner: Akash Gilbert MD  Plan of care signed (Y/N): Y    Date of Patient follow up with Physician: 18    G-Code (if applicable):      Date G-Code Applied:  18        Progress Note: []  Yes  [x]  No  Next due by: Visit #33       Latex Allergy:  [x]NO      []YES  Preferred Language for Healthcare:   []English       []other:    Visit # Insurance Allowable   2 16 additional visits auth through  (used 23 priorly)     Pain level:  1/10 at rest; 10/10 when stabbing pain On 5/3/2018     SUBJECTIVE:   Pt was a little sore after last session but not bad. [place] so was sore with this and normal soreness today. He was able to stand all of his son's baseball practice after last session.      OBJECTIVE: 18  Observation:   Test measurements:    Joint mobility:               []Normal [x]Hypo              []Hyper     Palpation: n/t     Functional Mobility/Transfers: Indep     Posture: n/t     Bandages/Dressings/Incisions:     Gait: (include devices/WB status)  Pt very mild lateral heel whip on R   Single leg stance:painful     Squats: increased pain anterior hip                   ROM PROM- 4/19/18 AROM Comments     Left Right Left Right     Flexion  121 112         Extension  20 19         Abduction  48  40         Adduction             ER  46  36         IR  60  36                          Strength-4/19/18 Left Right Comments   Hip flexors  4+  4     Hip extension 5  4+     Hip abduction 5 4+      Hip adduction        Hip ER 5  4+  mild pain medial thigh   Hip IR 5  4+  mild pain lateral thigh   Quads  5  5     Hamstrings  5  5        Flexibility- 4/19/18 Left Right Comments   Hamstrings   Poor+     ITB (Obers test)         Hip flexor(Osmin test)         gastroc   Fair -      Rectus femoris(Elys test)                       Functional assessment on 4/24/18  Functional Assessment Tool Used: LEFS  Score: 48.75%        RESTRICTIONS/PRECAUTIONS: per script scanned into Media- 3 weeks FFWB    Exercises/Interventions:     Exercise/Equipment Resistance Repetitions Other comments   Stretching      Hamstring 5 x 30 secs    Hip Flexor     ITB- Rope     Grion     Quad     Inclined Calf     Towel Pull     Piriformis     Figure 4     SKTC      Half kneeling hip flexor stretch 3 x 30 secs     Quadruped rocking      Standing IT band stretch   Attempted supine with rope but had increased groin pain   Hip adductor No stretch felt and good range so not needed HEP    SLR      Standing hip flex SLR      Prone hip ER/IR rotations     Prone hip extn     standing hip Abduction     Adducton      HS curl on SB      Standing SLR hip flex  3 x 10   CC L 4   Standing SLR hip extn  3 x 10  CC L 4   Standing SLR hip abd 3 x 10  CC L 4   Standing SLR hip add  3x 10   CC L 4   SLR+      clams  Mild discomfort   Stool hip improvements in LE, proximal hip, and core control with self care, mobility, lifting, ambulation.  [] (13240) Provided verbal/tactile cueing for activities related to improving balance, coordination, kinesthetic sense, posture, motor skill, proprioception  to assist with LE, proximal hip, and core control in self care, mobility, lifting, ambulation and eccentric single leg control. NMR and Therapeutic Activities:    [x] (99552 or 80882) Provided verbal/tactile cueing for activities related to improving balance, coordination, kinesthetic sense, posture, motor skill, proprioception and motor activation to allow for proper function of core, proximal hip and LE with self care and ADLs  [] (43805) Gait Re-education- Provided training and instruction to the patient for proper LE, core and proximal hip recruitment and positioning and eccentric body weight control with ambulation re-education including up and down stairs     Home Exercise Program:  Pt was instructed in, and safely and correctly demonstrated home exercise program. Patient verbalized understanding of proper frequency of exercises. Copy of exercises was scanned into patient chart and can be found in the media file. Updated on 4/26/18 and discussed stretching every day and strengthening exercises he may rotate to get in 3x/week as fits his schedule.    [x] (67395) Reviewed/Progressed HEP activities related to strengthening, flexibility, endurance, ROM of core, proximal hip and LE for functional self-care, mobility, lifting and ambulation/stair navigation   [] (03252)Reviewed/Progressed HEP activities related to improving balance, coordination, kinesthetic sense, posture, motor skill, proprioception of core, proximal hip and LE for self care, mobility, lifting, and ambulation/stair navigation      Manual Treatments:  PROM / STM / Oscillations-Mobs:  G-I, II, III, IV (PA's, Inf., Post.)  [] (98496) Provided manual therapy to mobilize LE, proximal hip and/or LS spine soft tissue/joints for the purpose of modulating pain, promoting relaxation,  increasing ROM, reducing/eliminating soft tissue swelling/inflammation/restriction, improving soft tissue extensibility and allowing for proper ROM for normal function with self care, mobility, lifting and ambulation. Modalities:  will ice at home    Charges:  Timed Code Treatment Minutes: 56   Total Treatment Minutes: 58     [] EVAL (LOW) 55590   [] EVAL (MOD) 08591   [] EVAL (HIGH) 62183   [] RE-EVAL   [x] PE(61355) x  2   [] IONTO  [] NMR (11437) x      [] VASO  [] Manual (50672) x       [x] Other: Gait training  [x] TA x  1    [] Mech Traction (15221)  [] ES(attended) (72322)      [] ES (un) (37635):     GOALS: Short Term Goals: To be achieved in: 2 weeks  1. Independent in HEP and progression per patient tolerance, in order to prevent re-injury.- MET   2. Patient will have a decrease in pain to 0-2/10 to facilitate improvement in movement, function, and ADLs as indicated by Functional Deficits. - MET     Long Term Goals: To be achieved in: 12 weeks- NOT MET, progressing  1. Functional Disability index score of 80% or more for the LEFS to assist with reaching prior level of function. 2. Patient will demonstrate increased R hip AROM to 120 flex, 10 extn, 30 IR, 40 ER to allow for proper joint functioning as indicated by patients Functional Deficits. 3. Patient will demonstrate an increase in R hip strength to 4+/5 hip abd and extn and knee flex/extn to 5/5 to allow for proper functional mobility as indicated by patients Functional Deficits. 4. Patient will return to full duty work without increased symptoms or restriction. 5. 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      6. Patient will return to light jogging to return to playing sports with kids     Progression Towards Functional goals:  [] Patient is progressing as

## 2018-05-08 ENCOUNTER — HOSPITAL ENCOUNTER (OUTPATIENT)
Dept: PHYSICAL THERAPY | Age: 38
Discharge: HOME | End: 2018-05-09
Admitting: ORTHOPAEDIC SURGERY

## 2018-05-08 NOTE — FLOWSHEET NOTE
n/t     Bandages/Dressings/Incisions:     Gait: (include devices/WB status)  Pt very mild lateral heel whip on R   Single leg stance:painful     Squats: increased pain anterior hip                   ROM PROM- 4/19/18 AROM Comments     Left Right Left Right     Flexion  121 112         Extension  20 19         Abduction  48  40         Adduction             ER  46  36         IR  60  36                          Strength-4/19/18 Left Right Comments   Hip flexors  4+  4     Hip extension 5  4+     Hip abduction 5 4+      Hip adduction        Hip ER 5  4+  mild pain medial thigh   Hip IR 5  4+  mild pain lateral thigh   Quads  5  5     Hamstrings  5  5        Flexibility- 4/19/18 Left Right Comments   Hamstrings   Poor+     ITB (Obers test)         Hip flexor(Osmin test)         gastroc   Fair -      Rectus femoris(Elys test)                       Functional assessment on 4/24/18  Functional Assessment Tool Used: LEFS  Score: 48.75%        RESTRICTIONS/PRECAUTIONS: per script scanned into Media- 3 weeks FFWB    Exercises/Interventions:     Exercise/Equipment Resistance Repetitions Other comments   Stretching      Hamstring 3 x 30 secs    Hip Flexor     ITB- Rope     Grion     Quad     Inclined Calf     Towel Pull     Piriformis     Figure 4 3 x 30 secs     SKTC 10 x 10 secs     Half kneeling hip flexor stretch 3 x 30 secs     Quadruped rocking      Standing IT band stretch 3 x 30 secs  Attempted supine with rope but had increased groin pain   Hip adductor No stretch felt and good range so not needed HEP    SLR      Standing hip flex SLR  3 x 10 5 lbs     Prone hip ER/IR rotations      Prone hip extn 3 x 10 over edge of bed 5 lbs     standing hip Abduction 3 x 10 5 lbs     Adducton      HS curl on SB      Standing SLR hip flex  Standing SLR hip extn  Standing SLR hip abd Standing SLR hip add  SLR+      clams 3 x 10 10 lbs Mild discomfort   Stool hip ER/IR     Single leg bridge     Isometrics      Quad sets     glute set     HS set     TrA Cues for form    abd isometric     Ball Squeezes      Patellar Glides      Medial      Superior      Inferior            ROM      Passive      Active      Weight Shift      Hang Weights      Sheet Pulls      Ankle Pumps            CKC      Calf raises      circuit      Step ups      1 leg stand      Squatting      CC TKE      biodex Balance L  35, 33%   Monster Walks      Quadruped fire hydrants      Quadruped donkey kicks      Quadruped hip circles      Bridging      Lateral heel taps      Triple threats      Stool Scoots      PRE      Extension 3 x 10 70 lbs SL  RANGE:   Flexion 3 x 10 40 lbs SL Clicking in calf with full range RANGE:   TRX- SL squat 3 x 10      TRX- bridges 3 x 10      TRX - HS curls 3 x 10      TRX - plank with hip abd                        treadmill X 2 min walk warm up; 1 min jog, 1 min walk x 5     Cable Column      Alter G treadmill   Size M shorts;  L 8       Leg Press 3 x 10 150 lbs SL  RANGE:   Side plank     Forward plank     Bike X 10 mins upright ROM     Side-stepping and fwd/bwd jog   Pain in hip jogging bwds with hip extn motion and with hip abd/extn motion with side stepping. Treadmill      Gait training Educated on full WBIng - tendency to circumduct R LE so cued to increase knee and hip flex and avoid slight trendelenburg. crutch Cues to increase arm swing B      Patient education: Pt was educated on PT diagnosis, prognosis, and plan of care. Pt was educated on the use of ice throughout the day. Pt was educated on signs/symptoms of infection and DVT and to call with any questions or concerns. Pt educated on post-op dressings, DONA hose, and use of crutches/AD.         Therapeutic Exercise and NMR EXR  [x] (56582) Provided verbal/tactile cueing for activities related to strengthening, flexibility, endurance, ROM for improvements in LE, proximal hip, and core control with self care, mobility, lifting, ambulation.  [] (09035) Provided verbal/tactile cueing for activities related to improving balance, coordination, kinesthetic sense, posture, motor skill, proprioception  to assist with LE, proximal hip, and core control in self care, mobility, lifting, ambulation and eccentric single leg control. NMR and Therapeutic Activities:    [x] (80344 or 99538) Provided verbal/tactile cueing for activities related to improving balance, coordination, kinesthetic sense, posture, motor skill, proprioception and motor activation to allow for proper function of core, proximal hip and LE with self care and ADLs  [] (66152) Gait Re-education- Provided training and instruction to the patient for proper LE, core and proximal hip recruitment and positioning and eccentric body weight control with ambulation re-education including up and down stairs     Home Exercise Program:  Pt was instructed in, and safely and correctly demonstrated home exercise program. Patient verbalized understanding of proper frequency of exercises. Copy of exercises was scanned into patient chart and can be found in the media file. Updated on 4/26/18 and discussed stretching every day and strengthening exercises he may rotate to get in 3x/week as fits his schedule.    [x] (25542) Reviewed/Progressed HEP activities related to strengthening, flexibility, endurance, ROM of core, proximal hip and LE for functional self-care, mobility, lifting and ambulation/stair navigation   [] (24083)Reviewed/Progressed HEP activities related to improving balance, coordination, kinesthetic sense, posture, motor skill, proprioception of core, proximal hip and LE for self care, mobility, lifting, and ambulation/stair navigation      Manual Treatments:  PROM / STM / Oscillations-Mobs:  G-I, II, III, IV (PA's, Inf., Post.)  [] (65983) Provided manual therapy to mobilize LE, proximal hip and/or LS spine soft tissue/joints for the purpose of modulating pain, promoting relaxation,  increasing ROM, reducing/eliminating soft tissue co-morbidities. [] Plan just implemented, too soon to assess goals progression  [] Other:     ASSESSMENT:      Treatment/Activity Tolerance:  [x] Patient tolerated treatment well [] Patient limited by fatique  [] Patient limited by pain  [] Patient limited by other medical complications  [x] Other:  Pt did well with running on regular treadmill at 100% BW. He will slowly build up his speed over next couple months. He did well with strengthening and was fatigued as expected.      Prognosis: [x] Good [] Fair  [] Poor    Patient Requires Follow-up: [x] Yes  [] No     PLAN: Additional 2x/week for 8 weeks; send PT notes each week;  discuss gym/healthplex options; progress running on treadmill and TRX exercises and sideways movements  [x] Continue per plan of care [] Alter current plan (see comments)  [] Plan of care initiated [] Hold pending MD visit [] Discharge    Electronically signed by:      Lois Gonzalez PT, DPT

## 2018-05-15 ENCOUNTER — HOSPITAL ENCOUNTER (OUTPATIENT)
Dept: PHYSICAL THERAPY | Age: 38
Discharge: HOME | End: 2018-05-16
Admitting: ORTHOPAEDIC SURGERY

## 2018-05-15 NOTE — FLOWSHEET NOTE
CC      SLR+      clams 3 x 10 10 lbs Mild discomfort   Stool hip ER/IR     Single leg bridge     Isometrics      Quad sets     glute set     HS set     TrA Cues for form    abd isometric     Ball Squeezes      Patellar Glides      Medial      Superior      Inferior            ROM      Passive      Active      Weight Shift      Hang Weights      Sheet Pulls      Ankle Pumps            CKC      Calf raises      circuit      Step ups      1 leg stand 3 x 30 secs on airex? ? Squatting      CC TKE      biodex Balance L  35, 33%   Monster Walks      Quadruped fire hydrants      Quadruped donkey kicks      Quadruped hip circles      Bridging      Lateral heel taps      Triple threats      Stool Scoots      PRE      Extension 3 x 10 70 lbs SL  RANGE:   Flexion 3 x 10 40 lbs SL Clicking in calf with full range RANGE:   TRX- SL squat     TRX- bridges     TRX - HS curls     TRX - plank with hip abd 2 x 30      Forward lunge X 2 laps     Side lunge 2 x 10 B            treadmill X 2 min walk warm up; 90 secs jog, 1 min walk x 5     Cable Column      Alter G treadmill   Size M shorts;  L 8       Leg Press 3 x 10 150 lbs SL  RANGE:   Side plank     Forward plank     Bike X 10 mins upright ROM     Side-stepping  X 5 laps- cones 25 feet apart  Pain in hip jogging bwds with hip extn motion and with hip abd/extn motion with side stepping. Treadmill      Gait training Educated on full WBIng - tendency to circumduct R LE so cued to increase knee and hip flex and avoid slight trendelenburg. crutch Cues to increase arm swing B      Patient education: Pt was educated on PT diagnosis, prognosis, and plan of care. Pt was educated on the use of ice throughout the day. Pt was educated on signs/symptoms of infection and DVT and to call with any questions or concerns. Pt educated on post-op dressings, DONA hose, and use of crutches/AD.         Therapeutic Exercise and NMR EXR  [x] (78979) Provided verbal/tactile cueing for activities related to strengthening, flexibility, endurance, ROM for improvements in LE, proximal hip, and core control with self care, mobility, lifting, ambulation.  [] (14092) Provided verbal/tactile cueing for activities related to improving balance, coordination, kinesthetic sense, posture, motor skill, proprioception  to assist with LE, proximal hip, and core control in self care, mobility, lifting, ambulation and eccentric single leg control. NMR and Therapeutic Activities:    [x] (60884 or 68607) Provided verbal/tactile cueing for activities related to improving balance, coordination, kinesthetic sense, posture, motor skill, proprioception and motor activation to allow for proper function of core, proximal hip and LE with self care and ADLs  [] (77953) Gait Re-education- Provided training and instruction to the patient for proper LE, core and proximal hip recruitment and positioning and eccentric body weight control with ambulation re-education including up and down stairs     Home Exercise Program:  Pt was instructed in, and safely and correctly demonstrated home exercise program. Patient verbalized understanding of proper frequency of exercises. Copy of exercises was scanned into patient chart and can be found in the media file. Updated on 4/26/18 and discussed stretching every day and strengthening exercises he may rotate to get in 3x/week as fits his schedule.    [x] (63274) Reviewed/Progressed HEP activities related to strengthening, flexibility, endurance, ROM of core, proximal hip and LE for functional self-care, mobility, lifting and ambulation/stair navigation   [] (49584)Reviewed/Progressed HEP activities related to improving balance, coordination, kinesthetic sense, posture, motor skill, proprioception of core, proximal hip and LE for self care, mobility, lifting, and ambulation/stair navigation      Manual Treatments:  PROM / STM / Oscillations-Mobs:  G-I, II, III, IV (PA's, Inf., Post.)  [] (50760) Provided manual therapy to mobilize LE, proximal hip and/or LS spine soft tissue/joints for the purpose of modulating pain, promoting relaxation,  increasing ROM, reducing/eliminating soft tissue swelling/inflammation/restriction, improving soft tissue extensibility and allowing for proper ROM for normal function with self care, mobility, lifting and ambulation. Modalities:  will ice at home    Charges:  Timed Code Treatment Minutes: 59   Total Treatment Minutes: 77     [] EVAL (LOW) 07376   [] EVAL (MOD) 38656   [] EVAL (HIGH) 68822   [] RE-EVAL   [x] VB(18933) x  2   [] IONTO  [] NMR (69969) x      [] VASO  [] Manual (07642) x       [x] Other: Gait training  [x] TA x  1    [] Mech Traction (30589)  [] ES(attended) (23174)      [] ES (un) (53223):     GOALS: Short Term Goals: To be achieved in: 2 weeks  1. Independent in HEP and progression per patient tolerance, in order to prevent re-injury.- MET   2. Patient will have a decrease in pain to 0-2/10 to facilitate improvement in movement, function, and ADLs as indicated by Functional Deficits. - MET     Long Term Goals: To be achieved in: 12 weeks- NOT MET, progressing  1. Functional Disability index score of 80% or more for the LEFS to assist with reaching prior level of function. 2. Patient will demonstrate increased R hip AROM to 120 flex, 10 extn, 30 IR, 40 ER to allow for proper joint functioning as indicated by patients Functional Deficits. 3. Patient will demonstrate an increase in R hip strength to 4+/5 hip abd and extn and knee flex/extn to 5/5 to allow for proper functional mobility as indicated by patients Functional Deficits. 4. Patient will return to full duty work without increased symptoms or restriction. 5. 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      6. Patient will return to light jogging to return to playing sports with kids     Progression

## 2018-05-17 ENCOUNTER — HOSPITAL ENCOUNTER (OUTPATIENT)
Dept: PHYSICAL THERAPY | Age: 38
Discharge: HOME | End: 2018-05-18
Admitting: ORTHOPAEDIC SURGERY

## 2018-05-17 NOTE — FLOWSHEET NOTE
hip add  CC L 4 Standing trunk rotation at CC      SLR+      clams s Mild discomfort   Stool hip ER/IR     Single leg bridge     Isometrics      Quad sets     glute set     HS set     TrA Cues for form    abd isometric     Ball Squeezes      Patellar Glides      Medial      Superior      Inferior            ROM      Passive      Active      Weight Shift      Hang Weights      Sheet Pulls      Ankle Pumps            CKC      Calf raises      circuit Repeat x 3 with tan band:  Wall sit x 30 secs   Monster walks fwd/bwd x 1 lap  Stool scoots x 1 lap SL     Step ups      1 leg stand 3 x 30 secs on airex     Squatting      CC TKE      biodex Balance L  35, 33%   Monster Walks      Quadruped fire hydrants      Quadruped donkey kicks      Quadruped hip circles      Bridging      Lateral heel taps 3 x 10 L1     Triple threats      Stool Scoots      PRE      Extension 3 x 10 70 lbs SL  RANGE:   Flexion 3 x 10 40 lbs SL  RANGE:   TRX- SL squat     TRX- bridges     TRX - HS curls     TRX - plank with hip abd      Forward lunge     Side lunge     Trunk rotations at CC 2 x 10 L 4 2 handles     treadmill      Cable Column      Alter G treadmill   Size M shorts;  L 8       Leg Press 3 x 10 150 lbs SL  RANGE:   Side plank     Forward plank     Bike X 10 mins upright ROM     Side-stepping    Pain in hip jogging bwds with hip extn motion and with hip abd/extn motion with side stepping. Treadmill      Gait training Educated on full WBIng - tendency to circumduct R LE so cued to increase knee and hip flex and avoid slight trendelenburg. crutch Cues to increase arm swing B      Patient education: Pt was educated on PT diagnosis, prognosis, and plan of care. Pt was educated on the use of ice throughout the day. Pt was educated on signs/symptoms of infection and DVT and to call with any questions or concerns. Pt educated on post-op dressings, DONA hose, and use of crutches/AD.         Therapeutic Exercise and NMR EXR  [x] (04752) Provided verbal/tactile cueing for activities related to strengthening, flexibility, endurance, ROM for improvements in LE, proximal hip, and core control with self care, mobility, lifting, ambulation.  [] (28913) Provided verbal/tactile cueing for activities related to improving balance, coordination, kinesthetic sense, posture, motor skill, proprioception  to assist with LE, proximal hip, and core control in self care, mobility, lifting, ambulation and eccentric single leg control. NMR and Therapeutic Activities:    [x] (15936 or 07949) Provided verbal/tactile cueing for activities related to improving balance, coordination, kinesthetic sense, posture, motor skill, proprioception and motor activation to allow for proper function of core, proximal hip and LE with self care and ADLs  [] (90155) Gait Re-education- Provided training and instruction to the patient for proper LE, core and proximal hip recruitment and positioning and eccentric body weight control with ambulation re-education including up and down stairs     Home Exercise Program:  Pt was instructed in, and safely and correctly demonstrated home exercise program. Patient verbalized understanding of proper frequency of exercises. Copy of exercises was scanned into patient chart and can be found in the media file. Updated on 4/26/18 and discussed stretching every day and strengthening exercises he may rotate to get in 3x/week as fits his schedule.    [x] (08725) Reviewed/Progressed HEP activities related to strengthening, flexibility, endurance, ROM of core, proximal hip and LE for functional self-care, mobility, lifting and ambulation/stair navigation   [] (43915)Reviewed/Progressed HEP activities related to improving balance, coordination, kinesthetic sense, posture, motor skill, proprioception of core, proximal hip and LE for self care, mobility, lifting, and ambulation/stair navigation      Manual Treatments:  PROM / STM / Oscillations-Mobs:  G-I, II, III, IV (PA's, Inf., Post.)  [] (27717) Provided manual therapy to mobilize LE, proximal hip and/or LS spine soft tissue/joints for the purpose of modulating pain, promoting relaxation,  increasing ROM, reducing/eliminating soft tissue swelling/inflammation/restriction, improving soft tissue extensibility and allowing for proper ROM for normal function with self care, mobility, lifting and ambulation. Modalities:  will ice at home    Charges:  Timed Code Treatment Minutes: 56   Total Treatment Minutes: 66     [] EVAL (LOW) 55614   [] EVAL (MOD) 66821   [] EVAL (HIGH) 34352   [] RE-EVAL   [x] OM(70764) x  2   [] IONTO  [x] NMR (95618) x  1   [] VASO  [] Manual (52950) x       [] Other: Gait training  [x] TA x  1    [] Mech Traction (13956)  [] ES(attended) (95444)      [] ES (un) (45540):     GOALS: Short Term Goals: To be achieved in: 2 weeks  1. Independent in HEP and progression per patient tolerance, in order to prevent re-injury.- MET   2. Patient will have a decrease in pain to 0-2/10 to facilitate improvement in movement, function, and ADLs as indicated by Functional Deficits. - MET     Long Term Goals: To be achieved in: 12 weeks- NOT MET, progressing  1. Functional Disability index score of 80% or more for the LEFS to assist with reaching prior level of function. 2. Patient will demonstrate increased R hip AROM to 120 flex, 10 extn, 30 IR, 40 ER to allow for proper joint functioning as indicated by patients Functional Deficits. 3. Patient will demonstrate an increase in R hip strength to 4+/5 hip abd and extn and knee flex/extn to 5/5 to allow for proper functional mobility as indicated by patients Functional Deficits. 4. Patient will return to full duty work without increased symptoms or restriction. 5. 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      6. Patient will return to light jogging to return to playing sports with kids     Progression Towards Functional goals:  [x] Patient is progressing as expected towards functional goals listed. [] Progression is slowed due to complexities listed. [] Progression has been slowed due to co-morbidities. [] Plan just implemented, too soon to assess goals progression  [] Other:     ASSESSMENT:      Treatment/Activity Tolerance:  [x] Patient tolerated treatment well [] Patient limited by fatique  [] Patient limited by pain  [] Patient limited by other medical complications  [x] Other: Pt did arrive with more soreness in hips today so did not run. He did fine with strengthening and initially decreased his pain some once he loosened up. However by end of session he was sore again with the SLRs. He will rest over the weekend from exercises but keep stretching. He did not have pain today on SLS or lateral heel taps which he used to have increased pain on each of these showing his progress. Follow up on Tuesday.          Prognosis: [x] Good [] Fair  [] Poor    Patient Requires Follow-up: [x] Yes  [] No     PLAN: Additional 2x/week for 8 weeks; send PT notes each week;  discuss gym/healthplex options; progress running on treadmill and TRX exercises and sideways movements; continue with lunges  [x] Continue per plan of care [] Alter current plan (see comments)  [] Plan of care initiated [] Hold pending MD visit [] Discharge    Electronically signed by:      Tammy Romero PT, DPT

## 2018-05-22 ENCOUNTER — HOSPITAL ENCOUNTER (OUTPATIENT)
Dept: PHYSICAL THERAPY | Age: 38
Discharge: HOME | End: 2018-05-23
Admitting: ORTHOPAEDIC SURGERY

## 2018-05-22 NOTE — FLOWSHEET NOTE
[]Hyper     Palpation: n/t     Functional Mobility/Transfers: Indep     Posture: n/t     Bandages/Dressings/Incisions:     Gait: (include devices/WB status)  Pt very mild lateral heel whip on R   Single leg stance:painful     Squats: increased pain anterior hip                   ROM PROM- 4/19/18 AROM Comments     Left Right Left Right     Flexion  121 112         Extension  20 19         Abduction  48  40         Adduction             ER  46  36         IR  60  36                          Strength-4/19/18 Left Right Comments   Hip flexors  4+  4     Hip extension 5  4+     Hip abduction 5 4+      Hip adduction        Hip ER 5  4+  mild pain medial thigh   Hip IR 5  4+  mild pain lateral thigh   Quads  5  5     Hamstrings  5  5        Flexibility- 4/19/18 Left Right Comments   Hamstrings   Poor+     ITB (Obers test)         Hip flexor(Osmin test)         gastroc   Fair -      Rectus femoris(Elys test)                       Functional assessment on 4/24/18  Functional Assessment Tool Used: LEFS  Score: 48.75%        RESTRICTIONS/PRECAUTIONS: per script scanned into Media- 3 weeks FFWB    Exercises/Interventions:     Exercise/Equipment Resistance Repetitions Other comments   Stretching      Hamstring 3 x 30 secs    Hip Flexor     ITB- Rope     Grion     Quad     Inclined Calf     Towel Pull     Piriformis     Figure 4 3 x 30 secs     SKTC 10 x 10 secs     Half kneeling hip flexor stretch 3 x 30 secs     Quadruped rocking      Standing IT band stretch 3 x 30 secs  Attempted supine with rope but had increased groin pain   Hip adductor No stretch felt and good range so not needed HEP    SLR      Standing hip flex SLR      Prone hip ER/IR rotations     Prone hip extn     standing hip Abduction     Adducton      HS curl on SB      Standing SLR hip flex  CC L 4 Standing SLR hip extn  CC L 4 Standing SLR hip abd CC L 4 Standing SLR hip add  CC L 4 Standing trunk rotation at CC      SLR+      clams s Mild discomfort   Stool hip ER/IR     Single leg bridge     Isometrics      Quad sets     glute set     HS set     TrA Cues for form    abd isometric     Ball Squeezes      Patellar Glides      Medial      Superior      Inferior            ROM      Passive      Active      Weight Shift      Hang Weights      Sheet Pulls      Ankle Pumps            CKC      Calf raises      circuit      Step ups      1 leg stand      Squatting      CC TKE      biodex Balance L  35, 33%   Monster Walks      Quadruped fire hydrants      Quadruped donkey kicks 3 x 10      Quadruped hip circles X 30 CW/CCW     Bridging      Lateral heel taps      Triple threats      Stool Scoots      PRE      Extension 3 x 10 70 lbs SL  RANGE:   Flexion 3 x 10 40 lbs SL  RANGE:   TRX- SL squat     TRX- bridges     TRX - HS curls     TRX - plank with hip abd      Forward lunge X 2 laps     Side lunge 2 x 10 B      Trunk rotations at CC 2 x 10 L 4 2 handles  Initially hurt in hip IR position   treadmill X 2 min walk warm up; 120 secs jog, 1 min walk x 2  7 mph jogging   Cable Column      Alter G treadmill   Size M shorts;  L 8       Leg Press 3 x 10 150 lbs SL  RANGE:   Side plank 1 x 1 min on R and L side     Forward plank      Bike      Side-stepping and fwd/bwd jog   Pain in hip jogging bwds with hip extn motion and with hip abd/extn motion with side stepping. Treadmill      Gait training Educated on full WBIng - tendency to circumduct R LE so cued to increase knee and hip flex and avoid slight trendelenburg. crutch Cues to increase arm swing B      Patient education: Pt was educated on PT diagnosis, prognosis, and plan of care. Pt was educated on the use of ice throughout the day. Pt was educated on signs/symptoms of infection and DVT and to call with any questions or concerns. Pt educated on post-op dressings, DONA hose, and use of crutches/AD.         Therapeutic Exercise and NMR EXR  [x] (37112) Provided verbal/tactile cueing for activities related to strengthening, flexibility, endurance, ROM for improvements in LE, proximal hip, and core control with self care, mobility, lifting, ambulation.  [] (55213) Provided verbal/tactile cueing for activities related to improving balance, coordination, kinesthetic sense, posture, motor skill, proprioception  to assist with LE, proximal hip, and core control in self care, mobility, lifting, ambulation and eccentric single leg control. NMR and Therapeutic Activities:    [x] (55702 or 05554) Provided verbal/tactile cueing for activities related to improving balance, coordination, kinesthetic sense, posture, motor skill, proprioception and motor activation to allow for proper function of core, proximal hip and LE with self care and ADLs  [] (47818) Gait Re-education- Provided training and instruction to the patient for proper LE, core and proximal hip recruitment and positioning and eccentric body weight control with ambulation re-education including up and down stairs     Home Exercise Program:  Pt was instructed in, and safely and correctly demonstrated home exercise program. Patient verbalized understanding of proper frequency of exercises. Copy of exercises was scanned into patient chart and can be found in the media file. Updated on 4/26/18 and discussed stretching every day and strengthening exercises he may rotate to get in 3x/week as fits his schedule.    [x] (96269) Reviewed/Progressed HEP activities related to strengthening, flexibility, endurance, ROM of core, proximal hip and LE for functional self-care, mobility, lifting and ambulation/stair navigation   [] (10261)Reviewed/Progressed HEP activities related to improving balance, coordination, kinesthetic sense, posture, motor skill, proprioception of core, proximal hip and LE for self care, mobility, lifting, and ambulation/stair navigation      Manual Treatments:  PROM / STM / Oscillations-Mobs:  G-I, II, III, IV (PA's, Inf., Post.)  [] (20380) Provided

## 2018-05-29 ENCOUNTER — HOSPITAL ENCOUNTER (OUTPATIENT)
Dept: PHYSICAL THERAPY | Age: 38
Discharge: HOME | End: 2018-05-30
Admitting: ORTHOPAEDIC SURGERY

## 2018-05-29 NOTE — FLOWSHEET NOTE
[facility] [address] [place] [address]                        Physical Therapy Daily Treatment Note  Date:  2018    Patient Name:  Wyatt Jones    :  1980  MRN: [id number]    Medical/Treatment Diagnosis Information:  · Diagnosis: M25.551 R hip pain; s/p R hip arthroplasty on 18  PROCEDURE PERFORMED:  1. Right Hip arthroscopic labral debridement of macerated labral tissue    2. Right Hip arthroscopic acetabuloplasty to resect pincer lesion   3. Right Hip arthroscopic femoral head-neck osteochondroplasty to resect cam lesion  4. Right Hip arthroscopic removal of loose body / excision of os acetabuli  5. Right Hip subspine decompression  6. Right Hip acetabular chondroplasty   7. Right Hip arthroscopic synovectomy    · Treatment Diagnosis: M25.551 R knee pain; M62.81 muscle weakness  Insurance/Certification information:  PT Insurance Information:  E [employer] insurance - 16 additional visits auth through ; Fax notes each week  Physician Information:  Referring Practitioner: Rolo Man MD  Plan of care signed (Y/N): Y    Date of Patient follow up with Physician: 18    G-Code (if applicable):      Date G-Code Applied:  18        Progress Note: []  Yes  [x]  No  Next due by: Visit #33       Latex Allergy:  [x]NO      []YES  Preferred Language for Healthcare:   []English       []other:    Visit # Insurance Allowable   7 16 additional visits auth through  (used 23 priorly)     Pain level:  2-3/10 at rest; 4-5/10 with movement On 2018     SUBJECTIVE:   Pt reports his hip has been more sore over the weekend. He did do a lot this weekend. 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 He did a lot all weekend. He takes about 4-5 ibuprofen a day. He reports his hip is still better than it was before surgery.  He was taking like 8-9 ibuprofen a day before surgery. He is more sore laying on his R side now also like something is stabbing him. He reports no skin or color changes here.      OBJECTIVE: 4/19/18  Observation:   Test measurements:    Joint mobility:               []Normal               [x]Hypo              []Hyper     Palpation: n/t     Functional Mobility/Transfers: Indep     Posture: n/t     Bandages/Dressings/Incisions:     Gait: (include devices/WB status)  Pt very mild lateral heel whip on R   Single leg stance:painful     Squats: increased pain anterior hip                   ROM PROM- 4/19/18 AROM Comments     Left Right Left Right     Flexion  121 112         Extension  20 19         Abduction  48  40         Adduction             ER  46  36         IR  60  36                          Strength-4/19/18 Left Right Comments   Hip flexors  4+  4     Hip extension 5  4+     Hip abduction 5 4+      Hip adduction        Hip ER 5  4+  mild pain medial thigh   Hip IR 5  4+  mild pain lateral thigh   Quads  5  5     Hamstrings  5  5        Flexibility- 4/19/18 Left Right Comments   Hamstrings   Poor+     ITB (Obers test)         Hip flexor(Osmin test)         gastroc   Fair -      Rectus femoris(Elys test)                       Functional assessment on 4/24/18  Functional Assessment Tool Used: LEFS  Score: 48.75%        RESTRICTIONS/PRECAUTIONS: per script scanned into Media- 3 weeks FFWB    Exercises/Interventions:     Exercise/Equipment Resistance Repetitions Other comments   Stretching      Hamstring 3 x 30 secs    Hip Flexor     ITB- Rope     Grion     Quad     Inclined Calf     Towel Pull     Piriformis     Figure 4 3 x 30 secs     SKTC 10 x 10 secs     Half kneeling hip flexor stretch 3 x 30 secs     Quadruped rocking      Standing IT band stretch 3 x 30 secs  Attempted supine with rope but had increased groin pain   Hip adductor No stretch felt and good range so not needed HEP    SLR      Standing hip flex SLR  3 x 10 8 lbs was educated on the use of ice throughout the day. Pt was educated on signs/symptoms of infection and DVT and to call with any questions or concerns. Pt educated on post-op dressings, DONA hose, and use of crutches/AD. Therapeutic Exercise and NMR EXR  [x] (74925) Provided verbal/tactile cueing for activities related to strengthening, flexibility, endurance, ROM for improvements in LE, proximal hip, and core control with self care, mobility, lifting, ambulation.  [] (56465) Provided verbal/tactile cueing for activities related to improving balance, coordination, kinesthetic sense, posture, motor skill, proprioception  to assist with LE, proximal hip, and core control in self care, mobility, lifting, ambulation and eccentric single leg control. NMR and Therapeutic Activities:    [x] (85153 or 27123) Provided verbal/tactile cueing for activities related to improving balance, coordination, kinesthetic sense, posture, motor skill, proprioception and motor activation to allow for proper function of core, proximal hip and LE with self care and ADLs  [] (05367) Gait Re-education- Provided training and instruction to the patient for proper LE, core and proximal hip recruitment and positioning and eccentric body weight control with ambulation re-education including up and down stairs     Home Exercise Program:  Pt was instructed in, and safely and correctly demonstrated home exercise program. Patient verbalized understanding of proper frequency of exercises. Copy of exercises was scanned into patient chart and can be found in the media file. Updated on 4/26/18 and discussed stretching every day and strengthening exercises he may rotate to get in 3x/week as fits his schedule.    [x] (33077) Reviewed/Progressed HEP activities related to strengthening, flexibility, endurance, ROM of core, proximal hip and LE for functional self-care, mobility, lifting and ambulation/stair navigation   [] (12132)Reviewed/Progressed HEP activities related to improving balance, coordination, kinesthetic sense, posture, motor skill, proprioception of core, proximal hip and LE for self care, mobility, lifting, and ambulation/stair navigation      Manual Treatments:  PROM / STM / Oscillations-Mobs:  G-I, II, III, IV (PA's, Inf., Post.)  [] (89173) Provided manual therapy to mobilize LE, proximal hip and/or LS spine soft tissue/joints for the purpose of modulating pain, promoting relaxation,  increasing ROM, reducing/eliminating soft tissue swelling/inflammation/restriction, improving soft tissue extensibility and allowing for proper ROM for normal function with self care, mobility, lifting and ambulation. \"stick\" STM to R IT band and posterior in sidelying  Modalities:  will ice at home    Charges:  Timed Code Treatment Minutes: 53   Total Treatment Minutes: 68     [] EVAL (LOW) 25445   [] EVAL (MOD) 45359   [] EVAL (HIGH) 96344   [] RE-EVAL   [x] AD(83815) x  2   [] IONTO  [x] NMR (90436) x  1   [] VASO  [] Manual (78505) x       [] Other: Gait training  [x] TA x  1    [] Mech Traction (46644)  [] ES(attended) (44534)      [] ES (un) (32475):     GOALS: Short Term Goals: To be achieved in: 2 weeks  1. Independent in HEP and progression per patient tolerance, in order to prevent re-injury.- MET   2. Patient will have a decrease in pain to 0-2/10 to facilitate improvement in movement, function, and ADLs as indicated by Functional Deficits. - MET     Long Term Goals: To be achieved in: 12 weeks- NOT MET, progressing  1. Functional Disability index score of 80% or more for the LEFS to assist with reaching prior level of function. 2. Patient will demonstrate increased R hip AROM to 120 flex, 10 extn, 30 IR, 40 ER to allow for proper joint functioning as indicated by patients Functional Deficits.    3. Patient will demonstrate an increase in R hip strength to 4+/5 hip abd and extn and knee flex/extn to 5/5 to allow for proper functional mobility as

## 2018-06-01 ENCOUNTER — HOSPITAL ENCOUNTER (OUTPATIENT)
Dept: OTHER | Age: 38
Discharge: HOME | End: 2018-06-30
Attending: ORTHOPAEDIC SURGERY | Admitting: ORTHOPAEDIC SURGERY

## 2018-06-05 ENCOUNTER — HOSPITAL ENCOUNTER (OUTPATIENT)
Dept: PHYSICAL THERAPY | Age: 38
Discharge: HOME | End: 2018-06-06
Admitting: ORTHOPAEDIC SURGERY

## 2018-06-12 ENCOUNTER — HOSPITAL ENCOUNTER (OUTPATIENT)
Dept: PHYSICAL THERAPY | Age: 38
Discharge: HOME | End: 2018-06-13
Admitting: ORTHOPAEDIC SURGERY

## 2018-06-12 NOTE — FLOWSHEET NOTE
[]Normal               [x]Hypo              []Hyper     Palpation: n/t     Functional Mobility/Transfers: Indep     Posture: n/t     Bandages/Dressings/Incisions:     Gait: (include devices/WB status)  Pt very mild lateral heel whip on R   Single leg stance:painful     Squats: increased pain anterior hip                   ROM PROM- 4/19/18 AROM Comments     Left Right Left Right     Flexion  121 112         Extension  20 19         Abduction  48  40         Adduction             ER  46  36         IR  60  36                          Strength-4/19/18 Left Right Comments   Hip flexors  4+  4     Hip extension 5  4+     Hip abduction 5 4+      Hip adduction        Hip ER 5  4+  mild pain medial thigh   Hip IR 5  4+  mild pain lateral thigh   Quads  5  5     Hamstrings  5  5        Flexibility- 4/19/18 Left Right Comments   Hamstrings   Poor+     ITB (Obers test)         Hip flexor(Osmin test)         gastroc   Fair -      Rectus femoris(Elys test)                       Functional assessment on 4/24/18  Functional Assessment Tool Used: LEFS  Score: 48.75%        RESTRICTIONS/PRECAUTIONS: per script scanned into Media- 3 weeks FFWB    Exercises/Interventions:     Exercise/Equipment Resistance Repetitions Other comments   Stretching      Hamstring 3 x 30 secs    Hip Flexor 5 x 30 secs off edge of bed     ITB- Rope      Grion      Quad 5 x 30 secs rope and propped      Inclined Calf On table leg 3 x 30 secs    Towel Pull     Piriformis     Figure 4 3 x 30 secs     SKTC 10 x 10 secs     Half kneeling hip flexor stretch 3 x 30 secs     Quadruped rocking      Standing IT band stretch 3 x 30 secs  Attempted supine with rope but had increased groin pain   Hip adductor No stretch felt and good range so not needed HEP    SLR      Standing hip flex SLR  3 x 10 8 lbs     Prone hip ER/IR rotations      Prone hip extn 3 x 10 over edge of bed 8 lbs     standing hip Abduction      Adducton      HS curl on SB      Standing SLR hip flex  Standing SLR hip extn  Standing SLR hip abd Standing SLR hip add  Standing hip flex      SLR+      clams s Mild discomfort   Stool hip ER/IR     Single leg bridge     Isometrics      Quad sets     glute set     HS set     TrA Cues for form    abd isometric     Ball Squeezes      Patellar Glides      Medial      Superior      Inferior            ROM      Passive      Active      Weight Shift      Hang Weights      Sheet Pulls      Ankle Pumps            CKC      Calf raises      circuit     Step ups     1 leg stand     Squatting     CC TKE      Squats on RB      biodex Balance L  35, 33%   Monster Walks      Quadruped fire hydrants      Quadruped donkey kicks 3 x 10      Quadruped hip circles      Bridging      Lateral heel taps      Triple threats      Stool Scoots      PRE      Extension 3 x 10 70 lbs SL  RANGE:   Flexion 3 x 10 40 lbs SL  RANGE:   TRX- SL squat     TRX- bridges     TRX - HS curls     TRX - plank with hip abd     Forward lunge     Side lunge     Trunk rotations at CC  2 handles  Initially hurt in hip IR position   treadmill   7 mph jogging   Cable Column      Alter G treadmill   Size M shorts;  L 8       Leg Press 3 x 10 150 lbs SL  RANGE:   Side plank     Forward plank     Bike X 8 mins upright ROM    Side-stepping and fwd/bwd jog Pain with attempted side-stepping so held    Treadmill      Gait training Educated on full WBIng - tendency to circumduct R LE so cued to increase knee and hip flex and avoid slight trendelenburg. crutch Cues to increase arm swing B      Patient education: Pt was educated on PT diagnosis, prognosis, and plan of care. Pt was educated on the use of ice throughout the day. Pt was educated on signs/symptoms of infection and DVT and to call with any questions or concerns. Pt educated on post-op dressings, DONA hose, and use of crutches/AD.         Therapeutic Exercise and NMR EXR  [x] (40525) Provided verbal/tactile cueing for activities related to manual therapy to mobilize LE, proximal hip and/or LS spine soft tissue/joints for the purpose of modulating pain, promoting relaxation,  increasing ROM, reducing/eliminating soft tissue swelling/inflammation/restriction, improving soft tissue extensibility and allowing for proper ROM for normal function with self care, mobility, lifting and ambulation. \"stick\" STM to R IT band and posterior in sidelying x 8 mins  Modalities:  will ice at home    Charges:  Timed Code Treatment Minutes: 47   Total Treatment Minutes: 51     [] EVAL (LOW) 61748   [] EVAL (MOD) 24452   [] EVAL (HIGH) 31374   [] RE-EVAL   [x] WZ(92142) x  1   [] IONTO  [] NMR (03451) x      [] VASO  [x] Manual (64320) x  1    [] Other: Gait training  [x] TA x  1    [] Mech Traction (92449)  [] ES(attended) (90923)      [] ES (un) (32084):     GOALS: Short Term Goals: To be achieved in: 2 weeks  1. Independent in HEP and progression per patient tolerance, in order to prevent re-injury.- MET   2. Patient will have a decrease in pain to 0-2/10 to facilitate improvement in movement, function, and ADLs as indicated by Functional Deficits. - MET     Long Term Goals: To be achieved in: 12 weeks- NOT MET, progressing  1. Functional Disability index score of 80% or more for the LEFS to assist with reaching prior level of function. 2. Patient will demonstrate increased R hip AROM to 120 flex, 10 extn, 30 IR, 40 ER to allow for proper joint functioning as indicated by patients Functional Deficits. 3. Patient will demonstrate an increase in R hip strength to 4+/5 hip abd and extn and knee flex/extn to 5/5 to allow for proper functional mobility as indicated by patients Functional Deficits. 4. Patient will return to full duty work without increased symptoms or restriction. 5. 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      6. Patient will return to light jogging to

## 2018-06-26 ENCOUNTER — HOSPITAL ENCOUNTER (OUTPATIENT)
Dept: PHYSICAL THERAPY | Age: 38
Discharge: HOME | End: 2018-06-27
Admitting: ORTHOPAEDIC SURGERY

## 2018-06-26 NOTE — PLAN OF CARE
Standing hip flex SLR  3 x 10 8 lbs     Prone hip ER/IR rotations      Prone hip extn 3 x 10 over edge of bed 8 lbs     standing hip Abduction      Adducton      HS curl on SB      Standing SLR hip flex  3 x 10 Standing SLR hip extn  3 x 10 Standing SLR hip abd 3 x 10 Standing SLR hip add  Standing hip flex      SLR+      clams s Mild discomfort   Stool hip ER/IR     Single leg bridge     Isometrics      Quad sets     glute set     HS set     TrA Cues for form    abd isometric     Ball Squeezes      Patellar Glides      Medial      Superior      Inferior            ROM      Passive      Active      Weight Shift      Hang Weights      Sheet Pulls      Ankle Pumps            CKC      Calf raises      circuit     Step ups     1 leg stand     Squatting     CC TKE      Squats on RB      biodex Balance L  35, 33%   Monster Walks      Quadruped fire hydrants      Quadruped donkey kicks      Quadruped hip circles      Bridging      Lateral heel taps      Triple threats      Stool Scoots      PRE      Extension 3 x 10 70 lbs SL  RANGE:   Flexion 3 x 10 40 lbs SL  RANGE:   TRX- SL squat     TRX- bridges     TRX - HS curls     TRX - plank with hip abd     Forward lunge     Side lunge     Trunk rotations at CC  2 handles  Initially hurt in hip IR position   treadmill   7 mph jogging   Cable Column      Alter G treadmill   Size M shorts;  L 8       Leg Press 3 x 10 150 lbs SL  RANGE:   Side plank     Forward plank     Bike X 8 mins upright ROM    Side-stepping and fwd/bwd jog     Treadmill     Gait training Cues to increase arm swing B      Patient education: Pt was educated on PT diagnosis, prognosis, and plan of care. Pt was educated on the use of ice throughout the day. Pt was educated on signs/symptoms of infection and DVT and to call with any questions or concerns. Pt educated on post-op dressings, DONA hose, and use of crutches/AD.         Therapeutic Exercise and NMR EXR  [x] (82127) Provided verbal/tactile cueing for activities related to strengthening, flexibility, endurance, ROM for improvements in LE, proximal hip, and core control with self care, mobility, lifting, ambulation.  [] (33613) Provided verbal/tactile cueing for activities related to improving balance, coordination, kinesthetic sense, posture, motor skill, proprioception  to assist with LE, proximal hip, and core control in self care, mobility, lifting, ambulation and eccentric single leg control. NMR and Therapeutic Activities:    [x] (44486 or 70224) Provided verbal/tactile cueing for activities related to improving balance, coordination, kinesthetic sense, posture, motor skill, proprioception and motor activation to allow for proper function of core, proximal hip and LE with self care and ADLs  [] (90263) Gait Re-education- Provided training and instruction to the patient for proper LE, core and proximal hip recruitment and positioning and eccentric body weight control with ambulation re-education including up and down stairs     Home Exercise Program:  Pt was instructed in, and safely and correctly demonstrated home exercise program. Patient verbalized understanding of proper frequency of exercises. Copy of exercises was scanned into patient chart and can be found in the media file. Updated on 4/26/18 and discussed stretching every day and strengthening exercises he may rotate to get in 3x/week as fits his schedule.    [x] (77252) Reviewed/Progressed HEP activities related to strengthening, flexibility, endurance, ROM of core, proximal hip and LE for functional self-care, mobility, lifting and ambulation/stair navigation   [] (82292)Reviewed/Progressed HEP activities related to improving balance, coordination, kinesthetic sense, posture, motor skill, proprioception of core, proximal hip and LE for self care, mobility, lifting, and ambulation/stair navigation      Manual Treatments:  PROM / STM / Oscillations-Mobs:  G-I, II, III, IV (PA's, Inf., Post.)  [x] (26439) Provided manual therapy to mobilize LE, proximal hip and/or LS spine soft tissue/joints for the purpose of modulating pain, promoting relaxation,  increasing ROM, reducing/eliminating soft tissue swelling/inflammation/restriction, improving soft tissue extensibility and allowing for proper ROM for normal function with self care, mobility, lifting and ambulation. PROM of R hip into hip flex with distraction x 30, long axis distraction x 3 mins, Hip circles CW/CCW in neutral x 30, hip circles knee bent to 90 flex CW/CCW x 30, supine abd x 30, ER/IR with knee flexed to 90 degrees x 30 supine and prone; prone side-lying hip extn x 30. Gentle long leg distraction x 10 reps\"stick\" STM to R IT band and posterior in sidelying x 8 mins  Modalities:  will ice at home    Charges:  Timed Code Treatment Minutes: 53   Total Treatment Minutes: 69     [] EVAL (LOW) 27269   [] EVAL (MOD) 81803   [] EVAL (HIGH) 20139   [] RE-EVAL   [x] QI(24747) x  2   [] IONTO  [] NMR (90861) x      [] VASO  [x] Manual (00852) x  1    [] Other: Gait training  [x] TA x  1    [] Mech Traction (37962)  [] ES(attended) (65777)      [] ES (un) (89796):     GOALS: Short Term Goals: To be achieved in: 2 weeks  1. Independent in HEP and progression per patient tolerance, in order to prevent re-injury.- MET   2. Patient will have a decrease in pain to 0-2/10 to facilitate improvement in movement, function, and ADLs as indicated by Functional Deficits. - MET     Long Term Goals: To be achieved in: 12 weeks-   1. Functional Disability index score of 80% or more for the LEFS to assist with reaching prior level of function. - NOT MET  2. Patient will demonstrate increased R hip AROM to 120 flex, 10 extn, 30 IR, 40 ER to allow for proper joint functioning as indicated by patients Functional Deficits. - NOT MET  3.  Patient will demonstrate an increase in R hip strength to 4+/5 hip abd and extn and knee flex/extn to 5/5 to allow for proper functional

## 2018-07-01 ENCOUNTER — HOSPITAL ENCOUNTER (OUTPATIENT)
Dept: OTHER | Age: 38
Discharge: HOME | End: 2018-07-31
Attending: ORTHOPAEDIC SURGERY | Admitting: ORTHOPAEDIC SURGERY

## 2018-07-03 ENCOUNTER — HOSPITAL ENCOUNTER (OUTPATIENT)
Dept: PHYSICAL THERAPY | Age: 38
Discharge: HOME | End: 2018-07-04
Admitting: ORTHOPAEDIC SURGERY

## 2018-07-03 NOTE — FLOWSHEET NOTE
reducing/eliminating soft tissue swelling/inflammation/restriction, improving soft tissue extensibility and allowing for proper ROM for normal function with self care, mobility, lifting and ambulation. PROM of R hip into hip flex with distraction x 30, long axis distraction x 3 mins, 0, hip circles knee bent to 90 flex CW/CCW x 30, supine abd x 30, ER/IR with knee flexed to 90 degrees x 30 supine and prone; manual HS stretching 3 x 20 secs\"stick\" STM to R IT band and posterior in sidelying x 8 mins  Modalities:  will ice at home    Charges:  Timed Code Treatment Minutes: 54   Total Treatment Minutes: 62     [] EVAL (LOW) 06369   [] EVAL (MOD) 22920   [] EVAL (HIGH) 72332   [] RE-EVAL   [x] IH(17734) x  2   [] IONTO  [] NMR (57420) x      [] VASO  [x] Manual (13311) x  1    [] Other: Gait training  [x] TA x  1    [] Mech Traction (26963)  [] ES(attended) (57585)      [] ES (un) (07412):     GOALS: Short Term Goals: To be achieved in: 2 weeks  1. Independent in HEP and progression per patient tolerance, in order to prevent re-injury.- MET   2. Patient will have a decrease in pain to 0-2/10 to facilitate improvement in movement, function, and ADLs as indicated by Functional Deficits. - MET     Long Term Goals: To be achieved in: 12 weeks-   1. Functional Disability index score of 80% or more for the LEFS to assist with reaching prior level of function. - NOT MET  2. Patient will demonstrate increased R hip AROM to 120 flex, 10 extn, 30 IR, 40 ER to allow for proper joint functioning as indicated by patients Functional Deficits. - NOT MET  3. Patient will demonstrate an increase in R hip strength to 4+/5 hip abd and extn and knee flex/extn to 5/5 to allow for proper functional mobility as indicated by patients Functional Deficits. - NOT MET  4. Patient will return to full duty work without increased symptoms or restriction. - NOT MET  5.  Patient will be able to lift 50 lbs from floor to waist without increased R hip

## 2018-07-10 ENCOUNTER — HOSPITAL ENCOUNTER (OUTPATIENT)
Dept: PHYSICAL THERAPY | Age: 38
Discharge: HOME | End: 2018-07-11
Admitting: ORTHOPAEDIC SURGERY

## 2018-07-10 NOTE — FLOWSHEET NOTE
[facility] [address] [place] [address]                        Physical Therapy Daily Treatment Note  Date:  7/10/2018    Patient Name:  Brad Winchester    :  1980  MRN: [id number]    Medical/Treatment Diagnosis Information:  · Diagnosis: M25.551 R hip pain; s/p R hip arthroplasty on 18  PROCEDURE PERFORMED:  1. Right Hip arthroscopic labral debridement of macerated labral tissue    2. Right Hip arthroscopic acetabuloplasty to resect pincer lesion   3. Right Hip arthroscopic femoral head-neck osteochondroplasty to resect cam lesion  4. Right Hip arthroscopic removal of loose body / excision of os acetabuli  5. Right Hip subspine decompression  6. Right Hip acetabular chondroplasty   7. Right Hip arthroscopic synovectomy    · Treatment Diagnosis: M25.551 R knee pain; M62.81 muscle weakness  Insurance/Certification information:  PT Insurance Information: [place] insurance - 16 additional visits auth through ; Fax notes each week  Physician Information:  Referring Practitioner: Casey Fischer MD  Plan of care signed (Y/N): Y    Date of Patient follow up with Physician: 18    G-Code (if applicable):      Date G-Code Applied:  18        Progress Note: []  Yes  [x]  No  Next due by: Visit #43       Latex Allergy:  [x]NO      []YES  Preferred Language for Healthcare:   []English       []other:    Visit # Insurance Allowable   12 16 additional visits auth through  (used 23 priorly)     Pain level:  3-4/10 at rest On 7/10/2018     SUBJECTIVE:   Pt reports that his hip still hurts but was not as bad as it was. He reports that he felt fine after last session, a little sore as expected.      OBJECTIVE:   Observation:   Test measurements:    Joint mobility:               []Normal               [x]Hypo              []Hyper     Palpation: n/t     Functional Mobility/Transfers: Indep     Posture: TrA Cues for form    abd isometric     Ball Squeezes      Patellar Glides      Medial      Superior      Inferior            ROM      Passive      Active      Weight Shift      Hang Weights      Sheet Pulls      Ankle Pumps            CKC      Calf raises      circuit Repeat x 2 with tan band:  Wall sit x 30 secs   Monster walks fwd x 1 lap  Stool scoots x 1 lap SL- hard stool    Step ups     1 leg stand     Squatting     CC TKE      Squats on RB      biodex Balance L  35, 33%   Monster Walks      Quadruped fire hydrants      Quadruped donkey kicks      Quadruped hip circles X 30 CW/CCW     Bridging      Lateral heel taps      Triple threats      Stool Scoots      PRE      Extension 3 x 10 70 lbs SL  RANGE:   Flexion 3 x 10 40 lbs SL  RANGE:   TRX- SL squat     TRX- bridges     TRX - HS curls     TRX - plank with hip abd     Forward lunge     Side lunge     Trunk rotations at CC  2 handles  Initially hurt in hip IR position   treadmill   7 mph jogging   Cable Column      Alter G treadmill   Size M shorts;  L 8       Leg Press 3 x 10 150 lbs SL  RANGE:   Side plank     Forward plank     Bike X 8 mins upright ROM    Side-stepping and fwd/bwd jog     Treadmill     Gait training Cues to increase arm swing B      Patient education: Pt was educated on PT diagnosis, prognosis, and plan of care. Pt was educated on the use of ice throughout the day. Pt was educated on signs/symptoms of infection and DVT and to call with any questions or concerns. Pt educated on post-op dressings, DONA hose, and use of crutches/AD.         Therapeutic Exercise and NMR EXR  [x] (61271) Provided verbal/tactile cueing for activities related to strengthening, flexibility, endurance, ROM for improvements in LE, proximal hip, and core control with self care, mobility, lifting, ambulation.  [] (76041) Provided verbal/tactile cueing for activities related to improving balance, coordination, kinesthetic sense, posture, motor skill, proprioception

## 2018-07-17 ENCOUNTER — HOSPITAL ENCOUNTER (OUTPATIENT)
Dept: PHYSICAL THERAPY | Age: 38
Discharge: HOME | End: 2018-07-18
Admitting: ORTHOPAEDIC SURGERY

## 2018-07-17 NOTE — FLOWSHEET NOTE
Isometrics      Quad sets     glute set     HS set     TrA Cues for form    abd isometric     Ball Squeezes      Patellar Glides      Medial      Superior      Inferior            ROM      Passive      Active      Weight Shift      Hang Weights      Sheet Pulls      Ankle Pumps            CKC      Calf raises      circuit Repeat x 3 with tan band:  Wall sit x 30 secs   Monster walks fwd x 1 lap  Stool scoots x 1 lap SL- hard stool    Step ups     1 leg stand     Squatting      CC TKE      Squats on RB      biodex Balance L  35, 33%   Monster Walks      Quadruped fire hydrants 3 x 10     Quadruped donkey kicks 3 x 10      Quadruped hip circles      Bridging      Lateral heel taps 3 x 10 L1     Triple threats      Stool Scoots      PRE      Extension 3 x 10 75 lbs SL  RANGE:   Flexion 3 x 10 45 lbs SL  RANGE:   TRX- SL squat     TRX- bridges     TRX - HS curls     TRX - plank with hip abd     Forward lunge     Side lunge     Trunk rotations at CC  2 handles  Initially hurt in hip IR position   treadmill   7 mph jogging   Cable Column      Alter G treadmill   Size M shorts;  L 8       Leg Press 3 x 10 150 lbs SL  RANGE:   Side plank     Forward plank     Bike X 8 mins upright ROM    Side-stepping and fwd/bwd jog     Treadmill     Gait training Cues to increase arm swing B      Patient education: Pt was educated on PT diagnosis, prognosis, and plan of care. Pt was educated on the use of ice throughout the day. Pt was educated on signs/symptoms of infection and DVT and to call with any questions or concerns. Pt educated on post-op dressings, DONA hose, and use of crutches/AD.         Therapeutic Exercise and NMR EXR  [x] (30415) Provided verbal/tactile cueing for activities related to strengthening, flexibility, endurance, ROM for improvements in LE, proximal hip, and core control with self care, mobility, lifting, ambulation.  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 [] Progression is slowed due to complexities listed. [] Progression has been slowed due to co-morbidities. [] Plan just implemented, too soon to assess goals progression  [] Other:     ASSESSMENT:      Treatment/Activity Tolerance:  [x] Patient tolerated treatment well [] Patient limited by fatique  [] Patient limited by pain  [] Patient limited by other medical complications  [x] Other:   Pt was initially slow to progress with his R hip due to long history of hip pain. He did however progress and was able to progress to running without pain. He did have a return of pain and a decrease in hip motion about a month ago now. He has improved since then but continues to have limits in hip ROM and strength. He will see MD next week. He will continue with stretches at home.        Prognosis: [x] Good [] Fair  [] Poor    Patient Requires Follow-up: [x] Yes  [] No     PLAN: Additional 2x/week for 8 weeks; send PT notes each week;  discuss gym/healthplex options; continue with foam roller or stick and manual  [x] Continue per plan of care [] Alter current plan (see comments)  [] Plan of care initiated [] Hold pending MD visit [] Discharge    Electronically signed by:  Tammy Romero PT, DPT

## 2018-07-23 ENCOUNTER — OFFICE VISIT (OUTPATIENT)
Dept: ORTHOPEDIC SURGERY | Age: 38
End: 2018-07-23

## 2018-07-23 ENCOUNTER — TELEPHONE (OUTPATIENT)
Dept: ORTHOPEDIC SURGERY | Age: 38
End: 2018-07-23

## 2018-07-23 VITALS
HEART RATE: 75 BPM | HEIGHT: 69 IN | DIASTOLIC BLOOD PRESSURE: 79 MMHG | BODY MASS INDEX: 23.7 KG/M2 | WEIGHT: 160 LBS | SYSTOLIC BLOOD PRESSURE: 112 MMHG

## 2018-07-23 DIAGNOSIS — M25.551 RIGHT HIP PAIN: Primary | ICD-10-CM

## 2018-07-23 DIAGNOSIS — M16.11 PRIMARY OSTEOARTHRITIS OF RIGHT HIP: ICD-10-CM

## 2018-07-23 DIAGNOSIS — Z98.890 STATUS POST HIP SURGERY: ICD-10-CM

## 2018-07-23 PROCEDURE — 20611 DRAIN/INJ JOINT/BURSA W/US: CPT | Performed by: ORTHOPAEDIC SURGERY

## 2018-07-23 PROCEDURE — 99214 OFFICE O/P EST MOD 30 MIN: CPT | Performed by: ORTHOPAEDIC SURGERY

## 2018-07-23 RX ORDER — DICLOFENAC SODIUM 75 MG/1
75 TABLET, DELAYED RELEASE ORAL 2 TIMES DAILY
Qty: 30 TABLET | Refills: 1 | Status: SHIPPED | OUTPATIENT
Start: 2018-07-23

## 2018-07-23 NOTE — PROGRESS NOTES
with Arminda Cm MD   Medication Sig Dispense Refill    diclofenac (VOLTAREN) 75 MG EC tablet Take 1 tablet by mouth 2 times daily Take 1 tab twice a day with food. Do not take if stomach discomfort 30 tablet 1     Social History     Social History    Marital status:      Spouse name: N/A    Number of children: N/A    Years of education: N/A     Occupational History    Not on file. Social History Main Topics    Smoking status: Current Every Day Smoker     Packs/day: 1.00    Smokeless tobacco: Never Used    Alcohol use No    Drug use: No    Sexual activity: Not on file     Other Topics Concern    Not on file     Social History Narrative    No narrative on file     No family history on file. Review of Systems:    Aracely Maria reported review of systems has been reviewed and has been scanned into his medical record for today's visit. The scanned image can be found in media images folder. He was instructed to contact his primary care physician regarding ROS positives if not already being addressed during today's visit. Objective:   Physical Exam  Vital Signs:  /79   Pulse 75   Ht 5' 9\" (1.753 m)   Wt 160 lb (72.6 kg)   BMI 23.63 kg/m²     Constitution:  Generally, Aracely Maria is [x] alert, [x] appears stated age, and [x] in no distress.   His general body habitus is [] Cachectic  [] Thin  [x] Normal  [] Obese  [] Morbidly Obese    Head: [x] Normocephalic  Eyes: [x] Extra-occular muscles intact  Left Ear: [x] External Ear normal   Right Ear: [x] External Ear normal   Nose: [x] Normal  Mouth: [x] Oral mucosa moist  [x] No perioral lesions    Pulmonary: [x] Respirations unlabored and regular  Skin: [x] Warm [x] Well perfused     Psychiatric:   [x] Good judgement and insight  [x] Oriented to [x] person, [x] place, and [x] time  [x] Mood appropriate for circumstances    Gait:   Gait is [] Normal  [x] Impaired Coxalgia  Assistive Device: [x] None  [] Knee Injection  Verbal consent was obtained. Risks and benefits were explained. Questions were encouraged and answered. Timeout Verification Completed including:    Correct patient: Colletta Doffing was identified    Correct procedure    Correct site & side    Correct equipment and supplies    Staff member: Jose Peter MD    The patient was given the option of performing today's injection using ultrasound guidance. We discussed the pros and cons of using the ultrasound for guidance. The patient chose to proceed, and today's injection was performed under sterile conditions using and Tech urSelfube ultrasound unit with a variable frequency (6.0-15.0  Mhz) linear transducer for localization and needle placement. The image was saved for the medical record. The injection site was prepped with Chlora-Prep using aseptic technique and an intra-articular hip injection was performed. Ropivicaine 0.5% 5 ml with Depomedrol 2 ml (40 mg/ml = 80 mg total) was injected. A sterile adhesive dressing was applied. Post procedure: The patient tolerated the treatment well. After the injection, Colletta Doffing noted markedly significant improvement in his hip symptoms as well improved range of motion (particularly with high flexion) and internal rotation. Instructions to patient:  Appropriate post injections instructions were given to the patient. Return to Clinic/Follow - Up:  Colletta Doffing was instructed to call the office if his symptoms worsen or if new symptoms appear prior to the next scheduled visit. He is specifically instructed to contact the office between now & his scheduled appointment if he has concerns related to his condition or if he needs assistance in scheduling the above tests. He is welcome to call for an appointment sooner if he has any additional concerns or questions.           Patient Education Materials Provided:  [x] Dr Calvin Higgins: New patient folder,  Anatomic Drawings

## 2018-07-24 ENCOUNTER — TELEPHONE (OUTPATIENT)
Dept: ORTHOPEDIC SURGERY | Age: 38
End: 2018-07-24

## 2018-07-24 ENCOUNTER — HOSPITAL ENCOUNTER (OUTPATIENT)
Dept: PHYSICAL THERAPY | Age: 38
Discharge: HOME | End: 2018-07-25
Admitting: ORTHOPAEDIC SURGERY

## 2018-07-24 NOTE — FLOWSHEET NOTE
[facility] [address] [place] [address]                        Physical Therapy Daily Treatment Note  Date:  2018    Patient Name:  Destiny Butcher    :  1980  MRN: [id number]    Medical/Treatment Diagnosis Information:  · Diagnosis: M25.551 R hip pain; s/p R hip arthroplasty on 18  PROCEDURE PERFORMED:  1. Right Hip arthroscopic labral debridement of macerated labral tissue    2. Right Hip arthroscopic acetabuloplasty to resect pincer lesion   3. Right Hip arthroscopic femoral head-neck osteochondroplasty to resect cam lesion  4. Right Hip arthroscopic removal of loose body / excision of os acetabuli  5. Right Hip subspine decompression  6. Right Hip acetabular chondroplasty   7. Right Hip arthroscopic synovectomy    · Treatment Diagnosis: M25.551 R knee pain; M62.81 muscle weakness  Insurance/Certification information:  PT Insurance Information: [place] insurance - 16 additional visits auth through ; Fax notes each week  Physician Information:  Referring Practitioner: Akash Gilbert MD  Plan of care signed (Y/N): Y    Date of Patient follow up with Physician: 18    G-Code (if applicable):      Date G-Code Applied:  18        Progress Note: []  Yes  [x]  No  Next due by: Visit #43       Latex Allergy:  [x]NO      []YES  Preferred Language for Healthcare:   []English       []other:    Visit # Insurance Allowable   14 16 additional visits auth through  (used 23 priorly)     Pain level:  3-4/10 at rest On 2018     SUBJECTIVE:    Pt saw MD and was given a cortisone injection into his hip yesterday. Pt reports he does not feel too bad today. His knees are more sore than his hip now. Pt reports MD was happy with how the x-rays looked. He hopes the injections will help him speed up recovery.      OBJECTIVE:   Observation:   Test measurements:    Joint mobility:               []Normal [x]Hypo              []Hyper     Palpation: n/t     Functional Mobility/Transfers: Indep     Posture: n/t     Bandages/Dressings/Incisions:     Gait: (include devices/WB status)  Pt very mild lateral heel whip on R   Single leg stance:painful     Squats: increased pain anterior hip                   ROM PROM- 6/26/18 AROM Comments     Left Right Left Right     Flexion  121 90         Extension  20 19         Abduction  48  25         Adduction             ER  46  25         IR  60  36                          Strength-6/26/18 Left Right Comments   Hip flexors  4+  4 pain     Hip extension 5  4+     Hip abduction 5 4 pain     Hip adduction        Hip ER 5  4 pain    Hip IR 5  4 pain    Quads  5  5     Hamstrings  5  5        Flexibility- 6/26/18 Left Right Comments   Hamstrings   Fair -      ITB (Obers test)         Hip flexor(Osmin test)         gastroc   Fair -      Rectus femoris(Elys test)                       Functional assessment on 6/26/18  Functional Assessment Tool Used: LEFS  Score: 50%        RESTRICTIONS/PRECAUTIONS: per script scanned into Media- 3 weeks FFWB    Exercises/Interventions:     Foam Roller      Exercise/Equipment Resistance Repetitions Other comments   Stretching      Hamstring     Hip Flexor     ITB- Rope     Grion     Quad     Inclined Calf     Towel Pull     Piriformis     Figure 4     SKTC     Half kneeling hip flexor stretch     Quadruped rocking     Standing IT band stretch  Attempted supine with rope but had increased groin pain   Hip adductor No stretch felt and good range so not needed HEP    SLR      Standing hip flex SLR  3 x 10 8 lbs    Prone hip ER/IR rotations     Prone hip extn 3 x 10 over edge of bed 8 lbs     standing hip Abduction 3 x 10 8 lbs     Adducton      HS curl on SB      Standing SLR hip flex  CC L 3 Standing SLR hip extn  CC L 3 Standing SLR hip abd CC L 3 Standing SLR hip add  CC L 3 Standing hip flex      SLR+      clams 3 x 10 10 lbs Mild ambulation. PROM of R hip into hip flex with distraction x 30, long axis distraction x 3 mins, 0, hip circles knee bent to 90 flex CW/CCW x 30, supine abd x 30, ER/IR with knee flexed to 90 degrees x 30 supine and prone; manual HS stretching 3 x 20 secs\"stick\" STM to R IT band and posterior in sidelying x 8 mins  Modalities:  will ice at home    Charges:  Timed Code Treatment Minutes: 42   Total Treatment Minutes: 46     [] EVAL (LOW) 51082   [] EVAL (MOD) 41508   [] EVAL (HIGH) 93483   [] RE-EVAL   [x] DL(10357) x  1   [] IONTO  [] NMR (50577) x      [] VASO  [x] Manual (63968) x  1    [] Other: Gait training  [x] TA x  1    [] Mech Traction (80826)  [] ES(attended) (47753)      [] ES (un) (35866):     GOALS: Short Term Goals: To be achieved in: 2 weeks  1. Independent in HEP and progression per patient tolerance, in order to prevent re-injury.- MET   2. Patient will have a decrease in pain to 0-2/10 to facilitate improvement in movement, function, and ADLs as indicated by Functional Deficits. - MET     Long Term Goals: To be achieved in: 12 weeks-   1. Functional Disability index score of 80% or more for the LEFS to assist with reaching prior level of function. - NOT MET  2. Patient will demonstrate increased R hip AROM to 120 flex, 10 extn, 30 IR, 40 ER to allow for proper joint functioning as indicated by patients Functional Deficits. - NOT MET  3. Patient will demonstrate an increase in R hip strength to 4+/5 hip abd and extn and knee flex/extn to 5/5 to allow for proper functional mobility as indicated by patients Functional Deficits. - NOT MET  4. Patient will return to full duty work without increased symptoms or restriction. - NOT MET  5. 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   - NOT MET  6.  Patient will return to light jogging to return to playing sports with kids - NOT MET    Progression Towards Functional

## 2018-07-24 NOTE — TELEPHONE ENCOUNTER
Patient called about seeing if he still needed to do PT for his last 2 visits or is he good to go for work. He is wanting/needing to inform his boss. His number is [phone] and can becontacted tomorrow, even can leave a message if needed.

## 2018-07-31 ENCOUNTER — HOSPITAL ENCOUNTER (OUTPATIENT)
Dept: PHYSICAL THERAPY | Age: 38
Discharge: HOME | End: 2018-08-01
Admitting: ORTHOPAEDIC SURGERY

## 2018-07-31 NOTE — PLAN OF CARE
Richard Peter 89     Physical Therapy Discharge Summary    Dear  Dr. Loni Roach,    We had the pleasure of treating the following patient for physical therapy services at [address] [place] [address]. A summary of our findings can be found in the discharge summary below. If you have any questions or concerns regarding these findings, please do not hesitate to contact me at the office phone number above. Thank you for the referral.     Physician Signature:________________________________Date:__________________  By signing above (or electronic signature), therapists plan is approved by physician      Overall Response to Treatment:   [x]Patient is responding well to treatment and improvement is noted with regards  to goals   []Patient should continue to improve in reasonable time if they continue HEP   []Patient has plateaued and is no longer responding to skilled PT intervention    []Patient is getting worse and would benefit from return to referring MD   []Patient unable to adhere to initial POC   [x]Other: Pt unfortunately has made limited progress in PT after surgery. He was initially progressing well but his pain returned once he tried to slowly increase activity level. He has made some improvements in hip motion and pain since his flare up a few weeks ago but has not returned to where it was before. Pt is Indep in HEP and will continue with stretches and exercises on his own. Pt is now discharged from PT but to call with any questions or concerns. Date range of Visits: 18- 18  Total Visits: 38                        Physical Therapy Daily Treatment Note  Date:  2018    Patient Name:  Yudelka Schneider    :  1980  MRN: [id number]    Medical/Treatment Diagnosis Information:  · Diagnosis: M25.551 R hip pain; s/p R hip arthroplasty on 18  PROCEDURE PERFORMED:  1.  Right progression  [] Other:     ASSESSMENT:      Treatment/Activity Tolerance:  [x] Patient tolerated treatment well [] Patient limited by fatique  [] Patient limited by pain  [] Patient limited by other medical complications  [x] Other:   Pt unfortunately has made limited progress in PT after surgery. He was initially progressing well but his pain returned once he tried to slowly increase activity level. He has made some improvements in hip motion and pain since his flare up a few weeks ago but has not returned to where it was before. Pt is Indep in [facility] and will continue with stretches and exercises on his own. Pt is now discharged from PT but to call with any questions or concerns.      Prognosis: [x] Good [] Fair  [] Poor    Patient Requires Follow-up: [] Yes  [x] No     PLAN:   [] Continue per plan of care [] Alter current plan (see comments)  [] Plan of care initiated [] Hold pending MD visit [x] Discharge    Electronically signed by:  Candace Feliz PT, DPT

## 2018-08-01 ENCOUNTER — HOSPITAL ENCOUNTER (OUTPATIENT)
Dept: OTHER | Age: 38
Discharge: HOME | End: 2018-08-31
Attending: ORTHOPAEDIC SURGERY | Admitting: ORTHOPAEDIC SURGERY

## 2018-09-17 ENCOUNTER — OFFICE VISIT (OUTPATIENT)
Dept: ORTHOPEDIC SURGERY | Age: 38
End: 2018-09-17

## 2018-09-17 VITALS
WEIGHT: 160 LBS | HEART RATE: 70 BPM | DIASTOLIC BLOOD PRESSURE: 82 MMHG | SYSTOLIC BLOOD PRESSURE: 130 MMHG | BODY MASS INDEX: 23.7 KG/M2 | HEIGHT: 69 IN

## 2018-09-17 DIAGNOSIS — M16.11 PRIMARY OSTEOARTHRITIS OF RIGHT HIP: Primary | ICD-10-CM

## 2018-09-17 PROCEDURE — 99213 OFFICE O/P EST LOW 20 MIN: CPT | Performed by: ORTHOPAEDIC SURGERY

## 2018-10-26 NOTE — PROGRESS NOTES
Brisa Lerma MD  Orthopaedic Surgery & Sports Medicine  Shoulder, Hip & Knee Specialist                       MAIN PHONE NUMBER  [phone]      PATIENT: Shashank Garza    41 y.o.  male  YOB: 1980   MRN:  [id number]       Date of current encounter: 9/17/2018  This encounter is evaluated as a:       [] New Patient Visit    [x] Established Patient Visit   [] Post-Op Visit     [] Consult: requested by          [] Worker's Comp       Patient's PCP is Dr. Anastasiia Gottlieb    Subjective:     Chief Complaint   Patient presents with    Follow-up     Rt hip pain       HPI:  Shashank Garza is a 41 y.o. male who comes in today for evaluation of the Right hip. Pain is mostly anterior in nature with some radiation to the groin. There may also be a C-Sign distribution of the pain. Thus far Shashank Garza has tried a hip arthroscopy 1 year ago. Unfortunately he has been having persistent pain and at time surgery was noted to have quite a bit of degenerative changes about the right hip. There is no pain radiating down the leg. Pain Assessment  Location of Pain:  (hip)  Location Modifiers: Right  Severity of Pain: 5  Quality of Pain: Throbbing, Sharp, Dull, Aching  Duration of Pain: Persistent  Frequency of Pain: Constant  Aggravating Factors: Bending, Stretching, Straightening, Exercise, Kneeling, Squatting, Standing, Walking, Stairs  Limiting Behavior: Yes  Relieving Factors: Other (Comment) (nothing)  Result of Injury: No  Work-Related Injury: No  Are there other pain locations you wish to document?: No    Patient Active Problem List   Diagnosis    Femoroacetabular impingement of right hip     No past medical history on file.   Past Surgical History:   Procedure Laterality Date    OTHER SURGICAL HISTORY Right 01/31/2018    RIGHT HIP ARTHROSCOPY LABRAL DEBRIDEMENT , ACETABULAR RIM    SHOULDER ARTHROSCOPY Right     LABRUM        Allergies:  Sulfa

## 2018-10-29 ENCOUNTER — OFFICE VISIT (OUTPATIENT)
Dept: ORTHOPEDIC SURGERY | Age: 38
End: 2018-10-29
Payer: OTHER GOVERNMENT

## 2018-10-29 VITALS — HEIGHT: 69 IN | WEIGHT: 160 LBS | BODY MASS INDEX: 23.7 KG/M2

## 2018-10-29 DIAGNOSIS — M16.11 PRIMARY OSTEOARTHRITIS OF RIGHT HIP: Primary | ICD-10-CM

## 2018-10-29 DIAGNOSIS — M25.551 PAIN OF RIGHT HIP JOINT: ICD-10-CM

## 2018-10-29 PROCEDURE — 99213 OFFICE O/P EST LOW 20 MIN: CPT | Performed by: ORTHOPAEDIC SURGERY

## 2018-10-29 NOTE — PROGRESS NOTES
History    Marital status:      Spouse name: N/A    Number of children: N/A    Years of education: N/A     Social History Main Topics    Smoking status: Current Every Day Smoker     Packs/day: 1.00    Smokeless tobacco: Never Used    Alcohol use No    Drug use: No    Sexual activity: Not Asked     Other Topics Concern    None     Social History Narrative    None     Current Outpatient Prescriptions   Medication Sig Dispense Refill    diclofenac (VOLTAREN) 75 MG EC tablet Take 1 tablet by mouth 2 times daily Take 1 tab twice a day with food. Do not take if stomach discomfort 30 tablet 1     No current facility-administered medications for this visit. Review of Systems:  Relevant review of systems reviewed and available in the patient's chart    Vital Signs: There were no vitals filed for this visit. General Exam:   Constitutional: Patient is adequately groomed with no evidence of malnutrition  DTRs: Deep tendon reflexes are intact  Mental Status: The patient is oriented to time, place and person. The patient's mood and affect are appropriate. Lymphatic: The lymphatic examination bilaterally reveals all areas to be without enlargement or induration. Vascular: Examination reveals no swelling or calf tenderness. Peripheral pulses are palpable and 2+. Neurological: The patient has good coordination. There is no weakness or sensory deficit. Body mass index is 23.63 kg/m². Right Hip Examination:    Inspection:  No erythema or signs of infection. There are no cutaneous lesions. Palpation:  Mild tenderness to palpation over the greater trochanteric region. Range of Motion:  Painful limited range of motion of the hip particularly with flexion and external rotation causing reproducible groin pain. Strength:  Core/5 strength in flexion and abduction limited by pain. Special Tests: There is a positive log roll maneuver. Positive straight leg raise against resistance. Expiration Date:   10/29/2019    SEDIMENTATION RATE     Standing Status:   Future     Standing Expiration Date:   10/29/2019    C-REACTIVE PROTEIN     Standing Status:   Future     Standing Expiration Date:   10/29/2019       Treatment Plan:  Patient has had extensive conservative care. His x-rays continue demonstrate well-maintained joint space. Have recommended acute phase reactants to evaluate for infection and an MRI with cartilage mapping. He'll follow-up in the office after above tests are completed. If we decide to do total hip arthroplasty he would be a good candidate for an anterior approach. Patient is aware that if surgery is recommended he will need to be nicotine free for at least 6 weeks before surgery.

## 2019-02-11 ENCOUNTER — TELEPHONE (OUTPATIENT)
Dept: ORTHOPEDIC SURGERY | Age: 39
End: 2019-02-11

## 2019-02-27 ENCOUNTER — HOSPITAL ENCOUNTER (OUTPATIENT)
Age: 39
Discharge: HOME | End: 2019-02-27
Payer: OTHER GOVERNMENT

## 2019-02-27 ENCOUNTER — OFFICE VISIT (OUTPATIENT)
Dept: ORTHOPEDIC SURGERY | Age: 39
End: 2019-02-27
Payer: OTHER GOVERNMENT

## 2019-02-27 DIAGNOSIS — M16.11 PRIMARY OSTEOARTHRITIS OF RIGHT HIP: Primary | ICD-10-CM

## 2019-02-27 DIAGNOSIS — M25.551 PAIN OF RIGHT HIP JOINT: ICD-10-CM

## 2019-02-27 LAB
ALBUMIN SERPL-MCNC: 4.8 G/DL (ref 3.4–5)
ANION GAP SERPL CALCULATED.3IONS-SCNC: 12 MMOL/L (ref 3–16)
APTT: 30.8 SEC (ref 26–36)
BASOPHILS ABSOLUTE: 0 K/UL (ref 0–0.2)
BASOPHILS RELATIVE PERCENT: 0.6 %
BILIRUBIN URINE: NEGATIVE
BLOOD, URINE: NEGATIVE
BUN BLDV-MCNC: 8 MG/DL (ref 7–20)
C-REACTIVE PROTEIN: 1.4 MG/L (ref 0–5.1)
CALCIUM SERPL-MCNC: 9.8 MG/DL (ref 8.3–10.6)
CHLORIDE BLD-SCNC: 102 MMOL/L (ref 99–110)
CLARITY: CLEAR
CO2: 27 MMOL/L (ref 21–32)
COLOR: YELLOW
CREAT SERPL-MCNC: 0.9 MG/DL (ref 0.9–1.3)
EOSINOPHILS ABSOLUTE: 0.2 K/UL (ref 0–0.6)
EOSINOPHILS RELATIVE PERCENT: 3.6 %
GFR AFRICAN AMERICAN: >60
GFR NON-AFRICAN AMERICAN: >60
GLUCOSE BLD-MCNC: 91 MG/DL (ref 70–99)
GLUCOSE URINE: NEGATIVE MG/DL
HCT VFR BLD CALC: 45.5 % (ref 40.5–52.5)
HEMOGLOBIN: 15.7 G/DL (ref 13.5–17.5)
INR BLD: 1.06 (ref 0.86–1.14)
KETONES, URINE: NEGATIVE MG/DL
LEUKOCYTE ESTERASE, URINE: NEGATIVE
LYMPHOCYTES ABSOLUTE: 1.4 K/UL (ref 1–5.1)
LYMPHOCYTES RELATIVE PERCENT: 23.9 %
MCH RBC QN AUTO: 30.6 PG (ref 26–34)
MCHC RBC AUTO-ENTMCNC: 34.6 G/DL (ref 31–36)
MCV RBC AUTO: 88.5 FL (ref 80–100)
MICROSCOPIC EXAMINATION: NORMAL
MONOCYTES ABSOLUTE: 0.4 K/UL (ref 0–1.3)
MONOCYTES RELATIVE PERCENT: 7.8 %
NEUTROPHILS ABSOLUTE: 3.7 K/UL (ref 1.7–7.7)
NEUTROPHILS RELATIVE PERCENT: 64.1 %
NITRITE, URINE: NEGATIVE
PDW BLD-RTO: 12.6 % (ref 12.4–15.4)
PH UA: 7
PLATELET # BLD: 218 K/UL (ref 135–450)
PMV BLD AUTO: 8.8 FL (ref 5–10.5)
POTASSIUM SERPL-SCNC: 4.3 MMOL/L (ref 3.5–5.1)
PROTEIN UA: NEGATIVE MG/DL
PROTHROMBIN TIME: 12.1 SEC (ref 9.8–13)
RBC # BLD: 5.14 M/UL (ref 4.2–5.9)
SEDIMENTATION RATE, ERYTHROCYTE: 0 MM/HR (ref 0–15)
SODIUM BLD-SCNC: 141 MMOL/L (ref 136–145)
SPECIFIC GRAVITY UA: 1.01
TRANSFERRIN: 257 MG/DL (ref 200–360)
URINE TYPE: NORMAL
UROBILINOGEN, URINE: 0.2 E.U./DL
WBC # BLD: 5.7 K/UL (ref 4–11)

## 2019-02-27 PROCEDURE — 85730 THROMBOPLASTIN TIME PARTIAL: CPT

## 2019-02-27 PROCEDURE — 85025 COMPLETE CBC W/AUTO DIFF WBC: CPT

## 2019-02-27 PROCEDURE — 99214 OFFICE O/P EST MOD 30 MIN: CPT | Performed by: PHYSICIAN ASSISTANT

## 2019-02-27 PROCEDURE — 81003 URINALYSIS AUTO W/O SCOPE: CPT

## 2019-02-27 PROCEDURE — 80048 BASIC METABOLIC PNL TOTAL CA: CPT

## 2019-02-27 PROCEDURE — 82040 ASSAY OF SERUM ALBUMIN: CPT

## 2019-02-27 PROCEDURE — 85610 PROTHROMBIN TIME: CPT

## 2019-02-27 PROCEDURE — 87086 URINE CULTURE/COLONY COUNT: CPT

## 2019-02-27 PROCEDURE — 84466 ASSAY OF TRANSFERRIN: CPT

## 2019-02-27 PROCEDURE — 85652 RBC SED RATE AUTOMATED: CPT

## 2019-02-27 PROCEDURE — 36415 COLL VENOUS BLD VENIPUNCTURE: CPT

## 2019-02-27 PROCEDURE — 83036 HEMOGLOBIN GLYCOSYLATED A1C: CPT

## 2019-02-27 PROCEDURE — 86140 C-REACTIVE PROTEIN: CPT

## 2019-02-28 LAB
ESTIMATED AVERAGE GLUCOSE: 105.4 MG/DL
HBA1C MFR BLD: 5.3 %
URINE CULTURE, ROUTINE: NORMAL

## 2019-03-05 ENCOUNTER — HOSPITAL ENCOUNTER (OUTPATIENT)
Dept: PHYSICAL THERAPY | Age: 39
Setting detail: THERAPIES SERIES
Discharge: HOME | End: 2019-03-05
Payer: OTHER GOVERNMENT

## 2019-03-05 PROCEDURE — 97161 PT EVAL LOW COMPLEX 20 MIN: CPT

## 2019-03-05 PROCEDURE — 97530 THERAPEUTIC ACTIVITIES: CPT

## 2019-03-05 ASSESSMENT — PAIN DESCRIPTION - PAIN TYPE: TYPE: CHRONIC PAIN

## 2019-03-05 ASSESSMENT — PAIN DESCRIPTION - DESCRIPTORS: DESCRIPTORS: ACHING;STABBING;SHOOTING;SHARP

## 2019-03-05 ASSESSMENT — PAIN DESCRIPTION - ORIENTATION: ORIENTATION: RIGHT

## 2019-03-05 ASSESSMENT — PAIN DESCRIPTION - FREQUENCY: FREQUENCY: CONTINUOUS

## 2019-03-05 ASSESSMENT — PAIN DESCRIPTION - LOCATION: LOCATION: HIP

## 2019-03-05 ASSESSMENT — PAIN SCALES - GENERAL: PAINLEVEL_OUTOF10: 5

## 2019-03-07 ENCOUNTER — TELEPHONE (OUTPATIENT)
Dept: ORTHOPEDIC SURGERY | Age: 39
End: 2019-03-07

## 2019-03-14 ENCOUNTER — APPOINTMENT (OUTPATIENT)
Dept: PHYSICAL THERAPY | Age: 39
End: 2019-03-14
Payer: OTHER GOVERNMENT

## 2019-03-19 ENCOUNTER — APPOINTMENT (OUTPATIENT)
Dept: PHYSICAL THERAPY | Age: 39
End: 2019-03-19
Payer: OTHER GOVERNMENT

## 2019-03-26 ENCOUNTER — APPOINTMENT (OUTPATIENT)
Dept: PHYSICAL THERAPY | Age: 39
End: 2019-03-26
Payer: OTHER GOVERNMENT

## 2019-08-08 ENCOUNTER — TELEPHONE (OUTPATIENT)
Dept: ORTHOPEDIC SURGERY | Age: 39
End: 2019-08-08

## 2021-10-15 ENCOUNTER — CLINICAL DOCUMENTATION (OUTPATIENT)
Dept: OTHER | Age: 41
End: 2021-10-15

## 2023-06-08 ENCOUNTER — TELEPHONE (OUTPATIENT)
Dept: ORTHOPEDIC SURGERY | Age: 43
End: 2023-06-08

## 2023-06-08 NOTE — TELEPHONE ENCOUNTER
Faxed medical records for 1/19/18 to 4/30/18 to [facility] [employer] [facility] @ [phone]

## 2024-07-30 NOTE — TELEPHONE ENCOUNTER
Reached out to the patients wife. She was unavailable. We will see the patient in clinic tomorrow.
73